# Patient Record
Sex: MALE | Race: WHITE | NOT HISPANIC OR LATINO | Employment: FULL TIME | URBAN - METROPOLITAN AREA
[De-identification: names, ages, dates, MRNs, and addresses within clinical notes are randomized per-mention and may not be internally consistent; named-entity substitution may affect disease eponyms.]

---

## 2018-05-02 ENCOUNTER — OFFICE VISIT (OUTPATIENT)
Dept: CARDIOLOGY | Facility: CLINIC | Age: 63
End: 2018-05-02
Payer: COMMERCIAL

## 2018-05-02 ENCOUNTER — HOSPITAL ENCOUNTER (OUTPATIENT)
Dept: CARDIOLOGY | Facility: CLINIC | Age: 63
Discharge: HOME | End: 2018-05-02
Payer: COMMERCIAL

## 2018-05-02 ENCOUNTER — TELEPHONE (OUTPATIENT)
Dept: CARDIOLOGY | Facility: CLINIC | Age: 63
End: 2018-05-02

## 2018-05-02 VITALS
HEART RATE: 99 BPM | BODY MASS INDEX: 36.58 KG/M2 | DIASTOLIC BLOOD PRESSURE: 81 MMHG | WEIGHT: 270.06 LBS | HEIGHT: 72 IN | SYSTOLIC BLOOD PRESSURE: 132 MMHG

## 2018-05-02 DIAGNOSIS — R00.2 PALPITATION: Primary | ICD-10-CM

## 2018-05-02 DIAGNOSIS — R00.2 PALPITATION: ICD-10-CM

## 2018-05-02 DIAGNOSIS — M16.10 HIP ARTHRITIS: ICD-10-CM

## 2018-05-02 DIAGNOSIS — E66.09 CLASS 2 OBESITY DUE TO EXCESS CALORIES WITHOUT SERIOUS COMORBIDITY WITH BODY MASS INDEX (BMI) OF 36.0 TO 36.9 IN ADULT: ICD-10-CM

## 2018-05-02 PROBLEM — E66.812 CLASS 2 OBESITY DUE TO EXCESS CALORIES WITHOUT SERIOUS COMORBIDITY IN ADULT: Status: ACTIVE | Noted: 2018-05-02

## 2018-05-02 PROCEDURE — 99999 PR PBB SHADOW E&M-EST. PATIENT-LVL III: CPT | Mod: PBBFAC,,, | Performed by: INTERNAL MEDICINE

## 2018-05-02 PROCEDURE — 93000 ELECTROCARDIOGRAM COMPLETE: CPT | Mod: S$GLB,,, | Performed by: INTERNAL MEDICINE

## 2018-05-02 PROCEDURE — 99203 OFFICE O/P NEW LOW 30 MIN: CPT | Mod: S$GLB,,, | Performed by: INTERNAL MEDICINE

## 2018-05-02 RX ORDER — CYCLOBENZAPRINE HCL 5 MG
TABLET ORAL 3 TIMES DAILY PRN
COMMUNITY
Start: 2018-04-20 | End: 2018-08-30 | Stop reason: CLARIF

## 2018-05-02 NOTE — TELEPHONE ENCOUNTER
----- Message from Lizette Ramirez sent at 5/2/2018  3:36 PM CDT -----  Regarding: EKG ORDER  Please put in EKG order, thanks. Lizette [phone]

## 2018-05-02 NOTE — PROGRESS NOTES
Subjective:    Patient ID:  Randolph Villeda is a 62 y.o. male who presents for evaluation of Pre-op Exam (left hip replacement)      HPI     The patient is a 62 year old male presents for pre-op evaluation or a total right hip. He has been in good health and has not history of CAD, hypertension, diabetes and is on no meds.. 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 EKG is normal.      No results found for: NA, K, CL, CO2, BUN, CREATININE, GLU, HGBA1C, MG, AST, ALT, ALBUMIN, PROT, BILITOT, WBC, HGB, HCT, MCV, PLT, INR, PSA, TSH      No results found for: CHOL, HDL, TRIG    No results found for: LDLCALC    No past medical history on file.    Current Outpatient Prescriptions:     cyclobenzaprine (FLEXERIL) 5 MG tablet, 3 (three) times daily as needed. , Disp: , Rfl:         Review of Systems   Constitution: Negative for decreased appetite, diaphoresis, fever, weakness, malaise/fatigue, weight gain and weight loss.   HENT: Negative for congestion, ear discharge, ear pain and nosebleeds.    Eyes: Negative for blurred vision, double vision and visual disturbance.   Cardiovascular: Negative for chest pain, claudication, cyanosis, dyspnea on exertion, irregular heartbeat, leg swelling, near-syncope, orthopnea, palpitations, paroxysmal nocturnal dyspnea and syncope.   Respiratory: Negative for cough, hemoptysis, shortness of breath, sleep disturbances due to breathing, snoring, sputum production and wheezing.    Endocrine: Negative for polydipsia, polyphagia and polyuria.   Hematologic/Lymphatic: Negative for adenopathy and bleeding problem. Does not bruise/bleed easily.   Skin: Negative for color change, nail changes, poor wound healing and rash.   Musculoskeletal: Positive for joint pain (right hip). Negative for muscle cramps and muscle weakness.   Gastrointestinal: Negative for abdominal pain, anorexia, change in bowel habit, hematochezia, nausea and vomiting.   Genitourinary: Negative for dysuria, frequency and  hematuria.   Neurological: Negative for brief paralysis, difficulty with concentration, excessive daytime sleepiness, dizziness, focal weakness, headaches, light-headedness, seizures and vertigo.   Psychiatric/Behavioral: Negative for altered mental status and depression.   Allergic/Immunologic: Negative for persistent infections.        Objective:/81 (BP Location: Left arm, Patient Position: Sitting, BP Method: X-Large (Automatic))   Pulse 99   Ht 6' (1.829 m)   Wt 122.5 kg (270 lb 1 oz)   BMI 36.63 kg/m²           Physical Exam   Constitutional: He is oriented to person, place, and time. He appears well-developed and well-nourished.   obese   HENT:   Head: Normocephalic.   Right Ear: External ear normal.   Left Ear: External ear normal.   Nose: Nose normal.   Inspection of lips, teeth and gums normal   Eyes: EOM are normal. Pupils are equal, round, and reactive to light. No scleral icterus.   Neck: Normal range of motion. Neck supple. No JVD present. No tracheal deviation present. No thyromegaly present.   Cardiovascular: Normal rate, regular rhythm, S1 normal, S2 normal and intact distal pulses.  Exam reveals no gallop and no friction rub.    No murmur heard.  Pulses:       Carotid pulses are 2+ on the right side, and 2+ on the left side.       Dorsalis pedis pulses are 2+ on the right side.        Posterior tibial pulses are 2+ on the right side, and 2+ on the left side.   Pulmonary/Chest: Effort normal and breath sounds normal.   Abdominal: Bowel sounds are normal. He exhibits no distension. There is no hepatosplenomegaly. There is no tenderness. There is no guarding.   Musculoskeletal: Normal range of motion. He exhibits no edema or tenderness.   Lymphadenopathy:   Palpation of neck and groin lymph nodes normal   Neurological: He is alert and oriented to person, place, and time. No cranial nerve deficit. He exhibits normal muscle tone. Coordination normal.   Skin: Skin is dry.   No ankle nor  pretibial edema   Psychiatric: His behavior is normal. Judgment and thought content normal.         Assessment:       1. Hip arthritis    2. Class 2 obesity due to excess calories without serious comorbidity with body mass index (BMI) of 36.0 to 36.9 in adult         Plan:       Randolph was seen today for pre-op exam.    Diagnoses and all orders for this visit:    Hip arthritis    Class 2 obesity due to excess calories without serious comorbidity with body mass index (BMI) of 36.0 to 36.9 in adult    Other orders  -     cyclobenzaprine (FLEXERIL) 5 MG tablet; 3 (three) times daily as needed.     Low CV risk for planned RTKR

## 2018-08-31 PROBLEM — M47.812 OSTEOARTHRITIS OF CERVICAL SPINE: Status: ACTIVE | Noted: 2018-08-31

## 2019-07-05 ENCOUNTER — OFFICE VISIT (OUTPATIENT)
Dept: ENDOCRINOLOGY | Facility: CLINIC | Age: 64
End: 2019-07-05
Payer: COMMERCIAL

## 2019-07-05 ENCOUNTER — LAB VISIT (OUTPATIENT)
Dept: LAB | Facility: HOSPITAL | Age: 64
End: 2019-07-05
Attending: INTERNAL MEDICINE
Payer: COMMERCIAL

## 2019-07-05 VITALS
WEIGHT: 259.81 LBS | BODY MASS INDEX: 35.19 KG/M2 | HEART RATE: 89 BPM | SYSTOLIC BLOOD PRESSURE: 112 MMHG | HEIGHT: 72 IN | DIASTOLIC BLOOD PRESSURE: 70 MMHG

## 2019-07-05 DIAGNOSIS — R35.89 POLYURIA: ICD-10-CM

## 2019-07-05 DIAGNOSIS — E11.9 NEW ONSET TYPE 2 DIABETES MELLITUS: ICD-10-CM

## 2019-07-05 DIAGNOSIS — R63.4 WEIGHT LOSS: ICD-10-CM

## 2019-07-05 DIAGNOSIS — E11.9 NEW ONSET TYPE 2 DIABETES MELLITUS: Primary | ICD-10-CM

## 2019-07-05 LAB
ALBUMIN SERPL BCP-MCNC: 4.3 G/DL (ref 3.5–5.2)
ALP SERPL-CCNC: 95 U/L (ref 55–135)
ALT SERPL W/O P-5'-P-CCNC: 71 U/L (ref 10–44)
ANION GAP SERPL CALC-SCNC: 10 MMOL/L (ref 8–16)
AST SERPL-CCNC: 56 U/L (ref 10–40)
BILIRUB SERPL-MCNC: 0.6 MG/DL (ref 0.1–1)
BUN SERPL-MCNC: 19 MG/DL (ref 8–23)
CALCIUM SERPL-MCNC: 9.4 MG/DL (ref 8.7–10.5)
CHLORIDE SERPL-SCNC: 100 MMOL/L (ref 95–110)
CO2 SERPL-SCNC: 26 MMOL/L (ref 23–29)
CREAT SERPL-MCNC: 1.2 MG/DL (ref 0.5–1.4)
EST. GFR  (AFRICAN AMERICAN): >60 ML/MIN/1.73 M^2
EST. GFR  (NON AFRICAN AMERICAN): >60 ML/MIN/1.73 M^2
ESTIMATED AVG GLUCOSE: 255 MG/DL (ref 68–131)
GLUCOSE SERPL-MCNC: 280 MG/DL (ref 70–110)
HBA1C MFR BLD HPLC: 10.5 % (ref 4–5.6)
POTASSIUM SERPL-SCNC: 4.5 MMOL/L (ref 3.5–5.1)
PROT SERPL-MCNC: 7.8 G/DL (ref 6–8.4)
SODIUM SERPL-SCNC: 136 MMOL/L (ref 136–145)

## 2019-07-05 PROCEDURE — 80053 COMPREHEN METABOLIC PANEL: CPT | Mod: PO

## 2019-07-05 PROCEDURE — 99205 OFFICE O/P NEW HI 60 MIN: CPT | Mod: S$GLB,,, | Performed by: INTERNAL MEDICINE

## 2019-07-05 PROCEDURE — 99999 PR PBB SHADOW E&M-EST. PATIENT-LVL III: ICD-10-PCS | Mod: PBBFAC,,, | Performed by: INTERNAL MEDICINE

## 2019-07-05 PROCEDURE — 3046F HEMOGLOBIN A1C LEVEL >9.0%: CPT | Mod: CPTII,S$GLB,, | Performed by: INTERNAL MEDICINE

## 2019-07-05 PROCEDURE — 3008F PR BODY MASS INDEX (BMI) DOCUMENTED: ICD-10-PCS | Mod: CPTII,S$GLB,, | Performed by: INTERNAL MEDICINE

## 2019-07-05 PROCEDURE — 3046F PR MOST RECENT HEMOGLOBIN A1C LEVEL > 9.0%: ICD-10-PCS | Mod: CPTII,S$GLB,, | Performed by: INTERNAL MEDICINE

## 2019-07-05 PROCEDURE — 3008F BODY MASS INDEX DOCD: CPT | Mod: CPTII,S$GLB,, | Performed by: INTERNAL MEDICINE

## 2019-07-05 PROCEDURE — 99205 PR OFFICE/OUTPT VISIT, NEW, LEVL V, 60-74 MIN: ICD-10-PCS | Mod: S$GLB,,, | Performed by: INTERNAL MEDICINE

## 2019-07-05 PROCEDURE — 99999 PR PBB SHADOW E&M-EST. PATIENT-LVL III: CPT | Mod: PBBFAC,,, | Performed by: INTERNAL MEDICINE

## 2019-07-05 PROCEDURE — 36415 COLL VENOUS BLD VENIPUNCTURE: CPT | Mod: PO

## 2019-07-05 PROCEDURE — 83036 HEMOGLOBIN GLYCOSYLATED A1C: CPT

## 2019-07-05 RX ORDER — INSULIN PUMP SYRINGE, 3 ML
EACH MISCELLANEOUS
Qty: 1 EACH | Refills: 0 | Status: SHIPPED | OUTPATIENT
Start: 2019-07-05 | End: 2020-07-04

## 2019-07-05 RX ORDER — LANCETS
EACH MISCELLANEOUS
Qty: 400 EACH | Refills: 0 | Status: SHIPPED | OUTPATIENT
Start: 2019-07-05 | End: 2021-05-07

## 2019-07-05 RX ORDER — LANCETS
EACH MISCELLANEOUS
Qty: 150 EACH | Refills: 11 | Status: CANCELLED | OUTPATIENT
Start: 2019-07-05

## 2019-07-05 RX ORDER — INSULIN GLARGINE 100 [IU]/ML
10 INJECTION, SOLUTION SUBCUTANEOUS DAILY
Qty: 1 SYRINGE | Refills: 0 | COMMUNITY
Start: 2019-07-05 | End: 2019-07-17

## 2019-07-05 RX ORDER — INSULIN GLARGINE 100 [IU]/ML
15 INJECTION, SOLUTION SUBCUTANEOUS DAILY
Qty: 15 ML | Refills: 3 | Status: SHIPPED | OUTPATIENT
Start: 2019-07-05 | End: 2019-07-17

## 2019-07-05 RX ORDER — METFORMIN HYDROCHLORIDE 500 MG/1
500 TABLET ORAL 2 TIMES DAILY WITH MEALS
Qty: 60 TABLET | Refills: 3 | Status: SHIPPED | OUTPATIENT
Start: 2019-07-05 | End: 2019-07-17

## 2019-07-05 RX ORDER — GLIMEPIRIDE 2 MG/1
2 TABLET ORAL
Qty: 30 TABLET | Refills: 3 | Status: SHIPPED | OUTPATIENT
Start: 2019-07-05 | End: 2019-07-31

## 2019-07-05 RX ORDER — INSULIN PUMP SYRINGE, 3 ML
EACH MISCELLANEOUS
Qty: 1 EACH | Refills: 0 | Status: CANCELLED | OUTPATIENT
Start: 2019-07-05 | End: 2020-07-04

## 2019-07-05 NOTE — TELEPHONE ENCOUNTER
----- Message from Ty Frey sent at 7/5/2019 12:14 PM CDT -----  Contact: self  Patient want to speak with a nurse regarding clarification on insulin please call back at [phone] (home)

## 2019-07-05 NOTE — TELEPHONE ENCOUNTER
Please clarify  lantus sample states 10 units daily  Lantus Rx and chart note states 15 units daily    Please advise    Also will send Rx for meter and test strips in separate Rx encounter

## 2019-07-05 NOTE — TELEPHONE ENCOUNTER
Spoke w/ pt, verified should be giving 15 units daily of Lantus, not 10 units.  Pt verbalized understanding.  Will also send insurance preferred meter, strips and lancets to the pharmacy.

## 2019-07-05 NOTE — PROGRESS NOTES
CHIEF COMPLAINT: DM 2  63 year old being seen as a new patient. Here with wife. Recently seen by urology and when had labs done found to have . Went to [facility] ED 7/2/19. Given IVF and 10 subcutaneous regular insulin. BG decreased to 475 and discharged. Not currently on any DM Medications. They did buy a glucometer and checked this AM and BG was 413 before eating. Seeing urology for polyuria. States it started last week. No recent steroids. Was not sick at the time. + Polydipsia. States that had been eating increased amounts of deserts due to their daughter being in town. No N/V. No paresthesias.       PAST MEDICAL HISTORY/PAST SURGICAL HISTORY:  Reviewed in [facility]    SOCIAL HISTORY: Quit smoking 1970s. No alcohol    FAMILY HISTORY:  + DM 2. No known thyroid disease.     MEDICATIONS/ALLERGIES: The patient's MedCard has been updated and reviewed.      ROS:   Constitutional: weight decreased  Eyes: No Blurry vision. Had a recent retinal eye exam in June.   ENT: No dysphagia  Cardiovascular: Denies current anginal symptoms  Respiratory: Denies current respiratory difficulty  Gastrointestinal: Denies recent bowel disturbances  GenitoUrinary - No dysuria  Skin: No new skin rash  Neurologic: No focal neurologic complaints  Remainder ROS negative        PE:    GENERAL: Well developed, well nourished.  PSYCH:  appropriate mood and affect  EYES:  PERRL, EOM intact.  ENT: Nares patent, oropharynx clear, mucosa pink,   NECK: Supple, trachea midline, no palpable thyroid nodules  CHEST: Resp even and unlabored, CTA bilateral.  CARDIAC: RRR, S1, S2 heard, no murmurs, rubs, S3, or S4  ABDOMEN: Soft, non-tender, non-distended;  No organomegaly  VASCULAR:  DP pulses +2/4 bilaterally, no edema  NEURO: Gait steady, CN II-VII grossly intact  SKIN: No areas of breakdown, no acanthosis nigracans.  FEET- normal monofilament exam. 2 + pedal pulses. No cuts or abrasions.     LABS     ASSESSMENT/PLAN:  1. DM 2- Ne Onset. He is  symptomatic. Discussed would be best to start insulin. Showed how to use insulin pen and allowed him to give himself Lantus 15 units today in clinic. Discussed hypoglycemia and rule of 15. Start amaryl 2 mg daily. WIll start metformin but discussed lionel take several weeks to start working. Discussed s/e. Check BG AC/HS. Once BG start improving can transition off RAIN +/- insulin. Discussed yearly eye exam and foot care. Discussed that will most likely need a statin but can discuss at f/u.     2. Polyuria- due to # 1    3. Weight loss- most likely due to # 1.       FOLLOWUP  Copy of eye exam records- Dr. Miguel  Start Lantus 15 units today  DM Education- ASAP  Today CMP, A1c- do CMP stat  F/U 2 weeks- C-peptide, CMP, FLP- double Book 7/17.   [place]

## 2019-07-10 ENCOUNTER — CLINICAL SUPPORT (OUTPATIENT)
Dept: DIABETES | Facility: CLINIC | Age: 64
End: 2019-07-10
Payer: COMMERCIAL

## 2019-07-10 VITALS — WEIGHT: 259.94 LBS | BODY MASS INDEX: 35.21 KG/M2 | HEIGHT: 72 IN

## 2019-07-10 DIAGNOSIS — E11.9 NEW ONSET TYPE 2 DIABETES MELLITUS: ICD-10-CM

## 2019-07-10 PROCEDURE — G0108 PR DIAB MANAGE TRN  PER INDIV: ICD-10-PCS | Mod: S$GLB,,, | Performed by: DIETITIAN, REGISTERED

## 2019-07-10 PROCEDURE — G0108 DIAB MANAGE TRN  PER INDIV: HCPCS | Mod: S$GLB,,, | Performed by: DIETITIAN, REGISTERED

## 2019-07-10 PROCEDURE — 99999 PR PBB SHADOW E&M-EST. PATIENT-LVL I: ICD-10-PCS | Mod: PBBFAC,,,

## 2019-07-10 PROCEDURE — 99999 PR PBB SHADOW E&M-EST. PATIENT-LVL I: CPT | Mod: PBBFAC,,,

## 2019-07-10 NOTE — PROGRESS NOTES
Diabetes Education  Author: Dayna Li RD, CDE  Date: 7/10/2019    Diabetes Care Management Summary  Diabetes Education Record Assessment/Progress: Initial-Patient diagnosed with dm last week when at the urologist office.  He admits that symptoms of hyperglycemia were probably going on for a few months.  He was seen in Endocrinology last week and started on medications and insulin.  Appears to be doing much better already  Current Diabetes Risk Level: High     Last A1c:   Lab Results   Component Value Date    HGBA1C 10.5 (H) 07/05/2019     Last Visit with Diabetes Educator: : 07/10/2019  Last OPCM Referral: : Not Found   Enrolled in OPCM: No      Diabetes Type  Diabetes Type : Type II    Diabetes History  Diabetes Diagnosis: 0-1 year-new diagnosis  Current Treatment: Oral Medication, Insulin-Lantus 15 units daily, Metformin, Amaryl  Reviewed Problem List with Patient: Yes    Health Maintenance was reviewed today with patient. Discussed with patient importance of routine eye exams, foot exams/foot care, blood work (i.e.: A1c, microalbumin, and lipid), dental visits, yearly flu vaccine, and pneumonia vaccine as indicated by PCP. Patient verbalized understanding.     Health Maintenance Topics with due status: Not Due       Topic Last Completion Date    TETANUS VACCINE 04/08/2016    Foot Exam 07/05/2019    Hemoglobin A1c 07/05/2019    Influenza Vaccine Not Due     Health Maintenance Due   Topic Date Due    Hepatitis C Screening  1955    Lipid Panel  1955    Eye Exam  09/03/1966    Urine Microalbumin  09/03/1966    Pneumococcal Vaccine (Medium Risk) (1 of 1 - PPSV23) 09/03/1975    Low Dose Statin  09/03/1977    Colonoscopy  09/03/2006       Nutrition  Meal Planning: 3 meals per day, snacks between meal, diet drinks-Patient had previously been skipping breakfast.  Now eating 3 meals daily and has cut out concentrated sweets.  Had been eating more sweets than usual due to family being in town.  All  diet drinks  What type of beverages do you drink: diet soda/tea, water  Meal Plan 24 Hour Recall - Breakfast: Cereal 1/2 cup milk  Meal Plan 24 Hour Recall - Lunch: [place]  Meal Plan 24 Hour Recall - Dinner: Last night had Mexican meal- ate fajitas with no tortillas  Meal Plan 24 Hour Recall - Snack: 1/2 cup watermelon and a few grapes    Monitoring   Monitoring: Went out and bought a meter  Self Monitoring : Yes, monitoring 4 times daily  Blood Glucose Logs: Yes-Does not have logs with him today but states that everyday they are steadily decreasing.  Usually in 100-140 range in past 2 days  Do you use a personal continuous glucose monitor: No  In the last month, how often have you had a low blood sugar reaction: never  Can you tell when your blood sugar is too high: no    Exercise   Exercise Type: (Active but no programmed activity)    Current Diabetes Treatment   Current Treatment: Oral Medication, Insulin-Lantus 15 units daily, Metformin, Amaryl    Social History  Preferred Learning Method: Face to Face  Primary Support: Self, Spouse-wife present at visit today    Barriers to Change  Barriers to Change: None  Learning Challenges : None    Readiness to Learn   Readiness to Learn : Acceptance    Cultural Influences  Cultural Influences: No    Diabetes Education Assessment/Progress  Diabetes Disease Process (diabetes disease process and treatment options): Discussion, Comprehends Key Points, Written Materials Provided-Reviewed goal blood sugars and A1c value    Nutrition (Incorporating nutritional management into one's lifestyle): Discussion, Comprehends Key Points, Written Materials Provided-Reviewed carb sources and appropriate amounts per meal and snack.  Discussed label reading and tips for eating out.  Reviewed possible apps he could use to help with out to eat meals.  Discussed eating some carb with each meal to prevent hypoglycemia    Physical Activity (incorporating physical activity into one's lifestyle):  Discussion    Medications (states correct name, dose, onset, peak, duration, side effects & timing of meds): Discussion, Comprehends Key Points, Written Materials Provided-Taking Lantus at the same time daily.  Discussed action of other 2 oral medications.  Appears to be doing well with compliance with medications     Monitoring (monitoring blood glucose/other parameters & using results): Discussion-For now he will continue monitoring 3-4 times daily.  May be able to decrease frequency once sugars are more stable.  He is keeping logs as instructed and will bring them to his visit with Dr. Mcfarland next week for review    Acute Complications (preventing, detecting, and treating acute complications): Discussion, Comprehends Key Points, Written Materials Provided-No current hypoglycemia noted.  Discussed prevention treatment and symptoms of hypoglycemia     Behavioral (readiness for change, lifestyle practices, self-care behaviors): Discussion-Patient and wife both very interested in learning more about diabetes and seems motivated to make necessary changes needed to control blood sugars.     Goals  Patient has selected/evaluated goals during today's session: Yes, selected  Healthy Eating: In Progress-Will limit snacks to less than 15-20 grams of carb per snack    Diabetes Meal Plan  Calories: 1800  Carbohydrate Per Meal: 45-60g  Carbohydrate Per Snack : 15-20g    Today's Self-Management Care Plan was developed with the patient's input and is based on barriers identified during today's assessment.    The long and short-term goals in the care plan were written with the patient/caregiver's input. The patient has agreed to work toward these goals to improve his overall diabetes control.      The patient received a copy of today's self-management plan and verbalized understanding of the care plan, goals, and all of today's instructions.      The patient was encouraged to communicate with his physician and care team regarding  his condition(s) and treatment.  I provided the patient with my contact information today and encouraged him to contact me via phone or patient portal as needed.     Education Units of Time   Time Spent: 75 min

## 2019-07-17 ENCOUNTER — OFFICE VISIT (OUTPATIENT)
Dept: ENDOCRINOLOGY | Facility: CLINIC | Age: 64
End: 2019-07-17
Payer: COMMERCIAL

## 2019-07-17 VITALS
RESPIRATION RATE: 18 BRPM | BODY MASS INDEX: 35 KG/M2 | DIASTOLIC BLOOD PRESSURE: 76 MMHG | WEIGHT: 258.38 LBS | HEIGHT: 72 IN | HEART RATE: 76 BPM | SYSTOLIC BLOOD PRESSURE: 118 MMHG

## 2019-07-17 DIAGNOSIS — E11.9 TYPE 2 DIABETES MELLITUS WITHOUT COMPLICATION, WITHOUT LONG-TERM CURRENT USE OF INSULIN: Primary | ICD-10-CM

## 2019-07-17 PROCEDURE — 99213 PR OFFICE/OUTPT VISIT, EST, LEVL III, 20-29 MIN: ICD-10-PCS | Mod: S$GLB,,, | Performed by: INTERNAL MEDICINE

## 2019-07-17 PROCEDURE — 99999 PR PBB SHADOW E&M-EST. PATIENT-LVL III: ICD-10-PCS | Mod: PBBFAC,,, | Performed by: INTERNAL MEDICINE

## 2019-07-17 PROCEDURE — 3046F PR MOST RECENT HEMOGLOBIN A1C LEVEL > 9.0%: ICD-10-PCS | Mod: CPTII,S$GLB,, | Performed by: INTERNAL MEDICINE

## 2019-07-17 PROCEDURE — 3008F PR BODY MASS INDEX (BMI) DOCUMENTED: ICD-10-PCS | Mod: CPTII,S$GLB,, | Performed by: INTERNAL MEDICINE

## 2019-07-17 PROCEDURE — 3008F BODY MASS INDEX DOCD: CPT | Mod: CPTII,S$GLB,, | Performed by: INTERNAL MEDICINE

## 2019-07-17 PROCEDURE — 99999 PR PBB SHADOW E&M-EST. PATIENT-LVL III: CPT | Mod: PBBFAC,,, | Performed by: INTERNAL MEDICINE

## 2019-07-17 PROCEDURE — 99213 OFFICE O/P EST LOW 20 MIN: CPT | Mod: S$GLB,,, | Performed by: INTERNAL MEDICINE

## 2019-07-17 PROCEDURE — 3046F HEMOGLOBIN A1C LEVEL >9.0%: CPT | Mod: CPTII,S$GLB,, | Performed by: INTERNAL MEDICINE

## 2019-07-17 RX ORDER — METFORMIN HYDROCHLORIDE 1000 MG/1
1000 TABLET ORAL 2 TIMES DAILY WITH MEALS
Qty: 60 TABLET | Refills: 3 | Status: SHIPPED | OUTPATIENT
Start: 2019-07-17 | End: 2019-11-27 | Stop reason: SDUPTHER

## 2019-07-17 RX ORDER — PEN NEEDLE, DIABETIC 31 GX5/16"
NEEDLE, DISPOSABLE MISCELLANEOUS
Refills: 3 | COMMUNITY
Start: 2019-07-05 | End: 2021-05-07

## 2019-07-17 NOTE — PROGRESS NOTES
CHIEF COMPLAINT: DM 2  63 year old being seen as a f/u. Here for BG log review. On Lantus 15, metformin and amaryl 2 mg daily. Lowest BG in 70's. States no symptoms. He did have diarrhea initially with metformin but has since improved.                   PAST MEDICAL HISTORY/PAST SURGICAL HISTORY:  Reviewed in [facility]    SOCIAL HISTORY: Quit smoking 1970s. No alcohol    FAMILY HISTORY:  + DM 2. No known thyroid disease.     MEDICATIONS/ALLERGIES: The patient's MedCard has been updated and reviewed.      ROS:   Constitutional: weight stable.   Eyes: No Blurry vision. Had a recent retinal eye exam in June.   ENT: No dysphagia  Cardiovascular: Denies current anginal symptoms  Respiratory: Denies current respiratory difficulty  Gastrointestinal: Denies recent bowel disturbances  GenitoUrinary - No dysuria  Skin: No new skin rash  Neurologic: No focal neurologic complaints  Remainder ROS negative        PE:    GENERAL: Well developed, well nourished.  NECK: Supple, trachea midline, no palpable thyroid nodules  CHEST: Resp even and unlabored, CTA bilateral.  CARDIAC: RRR, S1, S2 heard, no murmurs, rubs, S3, or S4              ASSESSMENT/PLAN:  1. DM 2- BG significantly improved. Will increase metformin to 1000 mg BID. Call if any GI s/e. Will stop lantus. Continue glimeperide for now. Advised sending a log in 2 weeks. At that time will get a better idea if needs a 2nd agent and if so will switch RAIN to another medication that does not increase hypoglycemia risk and add to weight gain. Has seen DM education. Discussed at f/u with check FLP and most likely start statin.       FOLLOWUP  F/U 3 months with me- CMP, A1c, FLP- double book

## 2019-07-31 ENCOUNTER — TELEPHONE (OUTPATIENT)
Dept: ENDOCRINOLOGY | Facility: CLINIC | Age: 64
End: 2019-07-31

## 2019-07-31 NOTE — TELEPHONE ENCOUNTER
Let him know I reviewed the logs. Looks like getting hypoglycemia. Can you verify he is off insulin.    Will stop amaryl    Stay on metformin

## 2019-08-01 ENCOUNTER — PATIENT MESSAGE (OUTPATIENT)
Dept: ENDOCRINOLOGY | Facility: CLINIC | Age: 64
End: 2019-08-01

## 2019-08-02 ENCOUNTER — PATIENT MESSAGE (OUTPATIENT)
Dept: ENDOCRINOLOGY | Facility: CLINIC | Age: 64
End: 2019-08-02

## 2019-08-02 NOTE — TELEPHONE ENCOUNTER
Pt not on insulin  Confused about hypglycemia (some education provided)  Advised to send in logs in 1 week or immediately if hypoglycemic

## 2019-08-02 NOTE — TELEPHONE ENCOUNTER
Should not be getting hypoglycemia now that on metformin alone. Hypoglycemia is a side effect of amaryl and insulin, but he is off those now    Although we want his blood sugar controlled we do not want hypoglycemia. But should not happen anymore.     Can send log in 4 weeks

## 2019-08-14 ENCOUNTER — PATIENT MESSAGE (OUTPATIENT)
Dept: ENDOCRINOLOGY | Facility: CLINIC | Age: 64
End: 2019-08-14

## 2019-08-14 NOTE — TELEPHONE ENCOUNTER
Can stay on metformin. At this point can just check once a day. Can stagger and check AM and periodically before meals

## 2019-08-29 ENCOUNTER — PATIENT MESSAGE (OUTPATIENT)
Dept: ENDOCRINOLOGY | Facility: CLINIC | Age: 64
End: 2019-08-29

## 2019-08-30 ENCOUNTER — PATIENT MESSAGE (OUTPATIENT)
Dept: ENDOCRINOLOGY | Facility: CLINIC | Age: 64
End: 2019-08-30

## 2019-10-05 ENCOUNTER — LAB VISIT (OUTPATIENT)
Dept: LAB | Facility: HOSPITAL | Age: 64
End: 2019-10-05
Attending: INTERNAL MEDICINE
Payer: COMMERCIAL

## 2019-10-05 DIAGNOSIS — E11.9 TYPE 2 DIABETES MELLITUS WITHOUT COMPLICATION, WITHOUT LONG-TERM CURRENT USE OF INSULIN: ICD-10-CM

## 2019-10-05 LAB
ALBUMIN SERPL BCP-MCNC: 4.1 G/DL (ref 3.5–5.2)
ALP SERPL-CCNC: 81 U/L (ref 55–135)
ALT SERPL W/O P-5'-P-CCNC: 23 U/L (ref 10–44)
ANION GAP SERPL CALC-SCNC: 8 MMOL/L (ref 8–16)
AST SERPL-CCNC: 20 U/L (ref 10–40)
BILIRUB SERPL-MCNC: 0.4 MG/DL (ref 0.1–1)
BUN SERPL-MCNC: 24 MG/DL (ref 8–23)
CALCIUM SERPL-MCNC: 9.6 MG/DL (ref 8.7–10.5)
CHLORIDE SERPL-SCNC: 106 MMOL/L (ref 95–110)
CHOLEST SERPL-MCNC: 183 MG/DL (ref 120–199)
CHOLEST/HDLC SERPL: 5.2 {RATIO} (ref 2–5)
CO2 SERPL-SCNC: 26 MMOL/L (ref 23–29)
CREAT SERPL-MCNC: 1 MG/DL (ref 0.5–1.4)
EST. GFR  (AFRICAN AMERICAN): >60 ML/MIN/1.73 M^2
EST. GFR  (NON AFRICAN AMERICAN): >60 ML/MIN/1.73 M^2
ESTIMATED AVG GLUCOSE: 126 MG/DL (ref 68–131)
GLUCOSE SERPL-MCNC: 114 MG/DL (ref 70–110)
HBA1C MFR BLD HPLC: 6 % (ref 4–5.6)
HDLC SERPL-MCNC: 35 MG/DL (ref 40–75)
HDLC SERPL: 19.1 % (ref 20–50)
LDLC SERPL CALC-MCNC: 115.8 MG/DL (ref 63–159)
NONHDLC SERPL-MCNC: 148 MG/DL
POTASSIUM SERPL-SCNC: 4.3 MMOL/L (ref 3.5–5.1)
PROT SERPL-MCNC: 7.3 G/DL (ref 6–8.4)
SODIUM SERPL-SCNC: 140 MMOL/L (ref 136–145)
TRIGL SERPL-MCNC: 161 MG/DL (ref 30–150)

## 2019-10-05 PROCEDURE — 80053 COMPREHEN METABOLIC PANEL: CPT

## 2019-10-05 PROCEDURE — 83036 HEMOGLOBIN GLYCOSYLATED A1C: CPT

## 2019-10-05 PROCEDURE — 80061 LIPID PANEL: CPT

## 2019-10-05 PROCEDURE — 36415 COLL VENOUS BLD VENIPUNCTURE: CPT | Mod: PO

## 2019-10-14 ENCOUNTER — OFFICE VISIT (OUTPATIENT)
Dept: ENDOCRINOLOGY | Facility: CLINIC | Age: 64
End: 2019-10-14
Payer: COMMERCIAL

## 2019-10-14 VITALS
SYSTOLIC BLOOD PRESSURE: 140 MMHG | BODY MASS INDEX: 32.23 KG/M2 | HEART RATE: 65 BPM | HEIGHT: 72 IN | DIASTOLIC BLOOD PRESSURE: 80 MMHG | WEIGHT: 238 LBS

## 2019-10-14 DIAGNOSIS — E11.9 TYPE 2 DIABETES MELLITUS WITHOUT COMPLICATION, WITHOUT LONG-TERM CURRENT USE OF INSULIN: Primary | ICD-10-CM

## 2019-10-14 PROCEDURE — 99213 PR OFFICE/OUTPT VISIT, EST, LEVL III, 20-29 MIN: ICD-10-PCS | Mod: S$GLB,,, | Performed by: INTERNAL MEDICINE

## 2019-10-14 PROCEDURE — 3044F HG A1C LEVEL LT 7.0%: CPT | Mod: CPTII,S$GLB,, | Performed by: INTERNAL MEDICINE

## 2019-10-14 PROCEDURE — 3008F BODY MASS INDEX DOCD: CPT | Mod: CPTII,S$GLB,, | Performed by: INTERNAL MEDICINE

## 2019-10-14 PROCEDURE — 3044F PR MOST RECENT HEMOGLOBIN A1C LEVEL <7.0%: ICD-10-PCS | Mod: CPTII,S$GLB,, | Performed by: INTERNAL MEDICINE

## 2019-10-14 PROCEDURE — 99999 PR PBB SHADOW E&M-EST. PATIENT-LVL III: CPT | Mod: PBBFAC,,, | Performed by: INTERNAL MEDICINE

## 2019-10-14 PROCEDURE — 99999 PR PBB SHADOW E&M-EST. PATIENT-LVL III: ICD-10-PCS | Mod: PBBFAC,,, | Performed by: INTERNAL MEDICINE

## 2019-10-14 PROCEDURE — 99213 OFFICE O/P EST LOW 20 MIN: CPT | Mod: S$GLB,,, | Performed by: INTERNAL MEDICINE

## 2019-10-14 PROCEDURE — 3008F PR BODY MASS INDEX (BMI) DOCUMENTED: ICD-10-PCS | Mod: CPTII,S$GLB,, | Performed by: INTERNAL MEDICINE

## 2019-10-14 NOTE — PROGRESS NOTES
CHIEF COMPLAINT: DM 2  64 year old being seen as a f/u. On metformin only at this time. No Polyuria. No paresthesias. Has been watching diet. Has been staying very active.         PAST MEDICAL HISTORY/PAST SURGICAL HISTORY:  Reviewed in [facility]    SOCIAL HISTORY: Quit smoking 1970s. No alcohol    FAMILY HISTORY:  + DM 2. No known thyroid disease.     MEDICATIONS/ALLERGIES: The patient's MedCard has been updated and reviewed.      ROS:   Constitutional: weight decreased  Eyes: No Blurry vision.   ENT: No dysphagia  Cardiovascular: Denies current anginal symptoms  Respiratory: Denies current respiratory difficulty  Gastrointestinal: Denies recent bowel disturbances  GenitoUrinary - No dysuria  Skin: No new skin rash  Neurologic: No focal neurologic complaints  Remainder ROS negative        PE:    GENERAL: Well developed, well nourished.  NECK: Supple, trachea midline, no palpable thyroid nodules  CHEST: Resp even and unlabored, CTA bilateral.  CARDIAC: RRR, S1, S2 heard, no murmurs, rubs, S3, or S4      Results for JOSAFAT FELTONOLAMIDE OMALLEY (MRN [id number]) as of 10/14/2019 14:04   Ref. Range 10/5/2019 07:44   Sodium Latest Ref Range: 136 - 145 mmol/L 140   Potassium Latest Ref Range: 3.5 - 5.1 mmol/L 4.3   Chloride Latest Ref Range: 95 - 110 mmol/L 106   CO2 Latest Ref Range: 23 - 29 mmol/L 26   Anion Gap Latest Ref Range: 8 - 16 mmol/L 8   BUN, Bld Latest Ref Range: 8 - 23 mg/dL 24 (H)   Creatinine Latest Ref Range: 0.5 - 1.4 mg/dL 1.0   eGFR if non African American Latest Ref Range: >60 mL/min/1.73 m^2 >60.0   eGFR if African American Latest Ref Range: >60 mL/min/1.73 m^2 >60.0   Glucose Latest Ref Range: 70 - 110 mg/dL 114 (H)   Calcium Latest Ref Range: 8.7 - 10.5 mg/dL 9.6   Alkaline Phosphatase Latest Ref Range: 55 - 135 U/L 81   PROTEIN TOTAL Latest Ref Range: 6.0 - 8.4 g/dL 7.3   Albumin Latest Ref Range: 3.5 - 5.2 g/dL 4.1   BILIRUBIN TOTAL Latest Ref Range: 0.1 - 1.0 mg/dL 0.4   AST Latest Ref Range: 10 - 40 U/L 20   ALT  Latest Ref Range: 10 - 44 U/L 23   Triglycerides Latest Ref Range: 30 - 150 mg/dL 161 (H)   Cholesterol Latest Ref Range: 120 - 199 mg/dL 183   HDL Latest Ref Range: 40 - 75 mg/dL 35 (L)   Hdl/Cholesterol Ratio Latest Ref Range: 20.0 - 50.0 % 19.1 (L)   LDL Cholesterol External Latest Ref Range: 63.0 - 159.0 mg/dL 115.8   Non-HDL Cholesterol Latest Units: mg/dL 148   Total Cholesterol/HDL Ratio Latest Ref Range: 2.0 - 5.0  5.2 (H)   Hemoglobin A1C External Latest Ref Range: 4.0 - 5.6 % 6.0 (H)   Estimated Avg Glucose Latest Ref Range: 68 - 131 mg/dL 126         ASSESSMENT/PLAN:  1. DM 2- BG significantly improved. On metformin alone at this time. Will continue current Tx. States had eye exam in June. Discussed starting a statin. Discussed benefits and risks of statin with DM. Discussed vascular disease risk with DM. He will think about it and get back with us.       FOLLOWUP  F/U 6 months with me- CMP, A1c, FLP, urine micro

## 2019-11-27 RX ORDER — METFORMIN HYDROCHLORIDE 1000 MG/1
TABLET ORAL
Qty: 60 TABLET | Refills: 3 | Status: SHIPPED | OUTPATIENT
Start: 2019-11-27 | End: 2020-04-30

## 2020-04-25 ENCOUNTER — LAB VISIT (OUTPATIENT)
Dept: LAB | Facility: HOSPITAL | Age: 65
End: 2020-04-25
Attending: INTERNAL MEDICINE
Payer: COMMERCIAL

## 2020-04-25 DIAGNOSIS — E11.9 TYPE 2 DIABETES MELLITUS WITHOUT COMPLICATION, WITHOUT LONG-TERM CURRENT USE OF INSULIN: ICD-10-CM

## 2020-04-25 LAB
ALBUMIN SERPL BCP-MCNC: 4.1 G/DL (ref 3.5–5.2)
ALBUMIN/CREAT UR: 3.3 UG/MG (ref 0–30)
ALP SERPL-CCNC: 76 U/L (ref 55–135)
ALT SERPL W/O P-5'-P-CCNC: 17 U/L (ref 10–44)
ANION GAP SERPL CALC-SCNC: 8 MMOL/L (ref 8–16)
AST SERPL-CCNC: 17 U/L (ref 10–40)
BILIRUB SERPL-MCNC: 0.2 MG/DL (ref 0.1–1)
BUN SERPL-MCNC: 37 MG/DL (ref 8–23)
CALCIUM SERPL-MCNC: 8.9 MG/DL (ref 8.7–10.5)
CHLORIDE SERPL-SCNC: 108 MMOL/L (ref 95–110)
CHOLEST SERPL-MCNC: 206 MG/DL (ref 120–199)
CHOLEST/HDLC SERPL: 4.3 {RATIO} (ref 2–5)
CO2 SERPL-SCNC: 27 MMOL/L (ref 23–29)
CREAT SERPL-MCNC: 1 MG/DL (ref 0.5–1.4)
CREAT UR-MCNC: 120 MG/DL (ref 23–375)
EST. GFR  (AFRICAN AMERICAN): >60 ML/MIN/1.73 M^2
EST. GFR  (NON AFRICAN AMERICAN): >60 ML/MIN/1.73 M^2
ESTIMATED AVG GLUCOSE: 117 MG/DL (ref 68–131)
GLUCOSE SERPL-MCNC: 116 MG/DL (ref 70–110)
HBA1C MFR BLD HPLC: 5.7 % (ref 4–5.6)
HDLC SERPL-MCNC: 48 MG/DL (ref 40–75)
HDLC SERPL: 23.3 % (ref 20–50)
LDLC SERPL CALC-MCNC: 135.2 MG/DL (ref 63–159)
MICROALBUMIN UR DL<=1MG/L-MCNC: 4 UG/ML
NONHDLC SERPL-MCNC: 158 MG/DL
POTASSIUM SERPL-SCNC: 4.3 MMOL/L (ref 3.5–5.1)
PROT SERPL-MCNC: 7.4 G/DL (ref 6–8.4)
SODIUM SERPL-SCNC: 143 MMOL/L (ref 136–145)
TRIGL SERPL-MCNC: 114 MG/DL (ref 30–150)

## 2020-04-25 PROCEDURE — 83036 HEMOGLOBIN GLYCOSYLATED A1C: CPT

## 2020-04-25 PROCEDURE — 82043 UR ALBUMIN QUANTITATIVE: CPT

## 2020-04-25 PROCEDURE — 36415 COLL VENOUS BLD VENIPUNCTURE: CPT | Mod: PO

## 2020-04-25 PROCEDURE — 80053 COMPREHEN METABOLIC PANEL: CPT

## 2020-04-25 PROCEDURE — 80061 LIPID PANEL: CPT

## 2020-04-30 ENCOUNTER — OFFICE VISIT (OUTPATIENT)
Dept: ENDOCRINOLOGY | Facility: CLINIC | Age: 65
End: 2020-04-30
Payer: COMMERCIAL

## 2020-04-30 ENCOUNTER — PATIENT MESSAGE (OUTPATIENT)
Dept: ENDOCRINOLOGY | Facility: CLINIC | Age: 65
End: 2020-04-30

## 2020-04-30 DIAGNOSIS — E11.9 TYPE 2 DIABETES MELLITUS WITHOUT COMPLICATION, WITHOUT LONG-TERM CURRENT USE OF INSULIN: Primary | ICD-10-CM

## 2020-04-30 PROCEDURE — 99213 PR OFFICE/OUTPT VISIT, EST, LEVL III, 20-29 MIN: ICD-10-PCS | Mod: 95,,, | Performed by: INTERNAL MEDICINE

## 2020-04-30 PROCEDURE — 99213 OFFICE O/P EST LOW 20 MIN: CPT | Mod: 95,,, | Performed by: INTERNAL MEDICINE

## 2020-04-30 RX ORDER — ATORVASTATIN CALCIUM 10 MG/1
10 TABLET, FILM COATED ORAL NIGHTLY
Qty: 30 TABLET | Refills: 3 | Status: SHIPPED | OUTPATIENT
Start: 2020-04-30 | End: 2020-08-20

## 2020-04-30 RX ORDER — METFORMIN HYDROCHLORIDE 500 MG/1
1000 TABLET, EXTENDED RELEASE ORAL
Qty: 60 TABLET | Refills: 6 | Status: SHIPPED | OUTPATIENT
Start: 2020-04-30 | End: 2020-11-02

## 2020-04-30 NOTE — PROGRESS NOTES
Established Patient - Audio Only Telehealth Visit     The patient location is: Home-[place]  Visit type: Virtual visit with audio only (telephone)   Time 17 min  The reason for the audio only service rather than synchronous audio and video virtual visit was related to technical difficulties or patient preference/necessity.     Each patient to whom I provide medical services by telemedicine is:  (1) informed of the relationship between the physician and patient and the respective role of any other health care provider with respect to management of the patient; and (2) notified that they may decline to receive medical services by telemedicine and may withdraw from such care at any time. Patient verbally consented to receive this service via voice-only telephone call.       CHIEF COMPLAINT: DM 2  64 year old being seen as a f/u. On metformin daily. No paresthesias. No change in vision.       PAST MEDICAL HISTORY/PAST SURGICAL HISTORY:  Reviewed in [facility]    SOCIAL HISTORY: Quit smoking 1970s. No alcohol    FAMILY HISTORY:  + DM 2. No known thyroid disease.     MEDICATIONS/ALLERGIES: The patient's MedCard has been updated and reviewed.      ROS:   Constitutional: weight decreased per patient   Eyes: No Blurry vision.   ENT: No dysphagia  Cardiovascular: Denies current anginal symptoms  Respiratory: Denies current respiratory difficulty  Gastrointestinal: Denies recent bowel disturbances  GenitoUrinary - No dysuria  Skin: No new skin rash  Neurologic: No focal neurologic complaints  Remainder ROS negative        PE:  Audio visit        ASSESSMENT/PLAN:  1. DM 2- only taking metformin daily. So will change to XR 1000 mg daily. Discussed risk benefits of statin and purpose for CVD risk reduction. Start atorvastatin 10      FOLLOWUP  F/U 6 months with me- CMP, A1c, lipid panel- Non fasting                   This service was not originating from a related E/M service provided within the previous 7 days nor will  to an E/M  service or procedure within the next 24 hours or my soonest available appointment.  Prevailing standard of care was able to be met in this audio-only visit.

## 2020-10-28 ENCOUNTER — LAB VISIT (OUTPATIENT)
Dept: LAB | Facility: HOSPITAL | Age: 65
End: 2020-10-28
Attending: INTERNAL MEDICINE
Payer: COMMERCIAL

## 2020-10-28 DIAGNOSIS — E11.9 TYPE 2 DIABETES MELLITUS WITHOUT COMPLICATION, WITHOUT LONG-TERM CURRENT USE OF INSULIN: ICD-10-CM

## 2020-10-28 LAB
ALBUMIN SERPL BCP-MCNC: 4.1 G/DL (ref 3.5–5.2)
ALP SERPL-CCNC: 62 U/L (ref 55–135)
ALT SERPL W/O P-5'-P-CCNC: 14 U/L (ref 10–44)
ANION GAP SERPL CALC-SCNC: 7 MMOL/L (ref 8–16)
AST SERPL-CCNC: 15 U/L (ref 10–40)
BILIRUB SERPL-MCNC: 0.4 MG/DL (ref 0.1–1)
BUN SERPL-MCNC: 28 MG/DL (ref 8–23)
CALCIUM SERPL-MCNC: 9 MG/DL (ref 8.7–10.5)
CHLORIDE SERPL-SCNC: 105 MMOL/L (ref 95–110)
CHOLEST SERPL-MCNC: 185 MG/DL (ref 120–199)
CHOLEST/HDLC SERPL: 4.2 {RATIO} (ref 2–5)
CO2 SERPL-SCNC: 29 MMOL/L (ref 23–29)
CREAT SERPL-MCNC: 1 MG/DL (ref 0.5–1.4)
EST. GFR  (AFRICAN AMERICAN): >60 ML/MIN/1.73 M^2
EST. GFR  (NON AFRICAN AMERICAN): >60 ML/MIN/1.73 M^2
ESTIMATED AVG GLUCOSE: 114 MG/DL (ref 68–131)
GLUCOSE SERPL-MCNC: 121 MG/DL (ref 70–110)
HBA1C MFR BLD HPLC: 5.6 % (ref 4–5.6)
HDLC SERPL-MCNC: 44 MG/DL (ref 40–75)
HDLC SERPL: 23.8 % (ref 20–50)
LDLC SERPL CALC-MCNC: 119 MG/DL (ref 63–159)
NONHDLC SERPL-MCNC: 141 MG/DL
POTASSIUM SERPL-SCNC: 4.1 MMOL/L (ref 3.5–5.1)
PROT SERPL-MCNC: 7 G/DL (ref 6–8.4)
SODIUM SERPL-SCNC: 141 MMOL/L (ref 136–145)
TRIGL SERPL-MCNC: 110 MG/DL (ref 30–150)

## 2020-10-28 PROCEDURE — 36415 COLL VENOUS BLD VENIPUNCTURE: CPT | Mod: PO

## 2020-10-28 PROCEDURE — 83036 HEMOGLOBIN GLYCOSYLATED A1C: CPT

## 2020-10-28 PROCEDURE — 80061 LIPID PANEL: CPT

## 2020-10-28 PROCEDURE — 80053 COMPREHEN METABOLIC PANEL: CPT

## 2020-11-02 ENCOUNTER — OFFICE VISIT (OUTPATIENT)
Dept: ENDOCRINOLOGY | Facility: CLINIC | Age: 65
End: 2020-11-02
Payer: COMMERCIAL

## 2020-11-02 VITALS
SYSTOLIC BLOOD PRESSURE: 106 MMHG | WEIGHT: 216.81 LBS | RESPIRATION RATE: 18 BRPM | DIASTOLIC BLOOD PRESSURE: 70 MMHG | BODY MASS INDEX: 30.35 KG/M2 | HEART RATE: 80 BPM | HEIGHT: 71 IN

## 2020-11-02 DIAGNOSIS — E11.9 TYPE 2 DIABETES MELLITUS WITHOUT COMPLICATION, WITHOUT LONG-TERM CURRENT USE OF INSULIN: Primary | ICD-10-CM

## 2020-11-02 PROCEDURE — 99213 OFFICE O/P EST LOW 20 MIN: CPT | Mod: S$GLB,,, | Performed by: INTERNAL MEDICINE

## 2020-11-02 PROCEDURE — 3008F PR BODY MASS INDEX (BMI) DOCUMENTED: ICD-10-PCS | Mod: CPTII,S$GLB,, | Performed by: INTERNAL MEDICINE

## 2020-11-02 PROCEDURE — 99999 PR PBB SHADOW E&M-EST. PATIENT-LVL III: CPT | Mod: PBBFAC,,, | Performed by: INTERNAL MEDICINE

## 2020-11-02 PROCEDURE — 3044F HG A1C LEVEL LT 7.0%: CPT | Mod: CPTII,S$GLB,, | Performed by: INTERNAL MEDICINE

## 2020-11-02 PROCEDURE — 3044F PR MOST RECENT HEMOGLOBIN A1C LEVEL <7.0%: ICD-10-PCS | Mod: CPTII,S$GLB,, | Performed by: INTERNAL MEDICINE

## 2020-11-02 PROCEDURE — 99213 PR OFFICE/OUTPT VISIT, EST, LEVL III, 20-29 MIN: ICD-10-PCS | Mod: S$GLB,,, | Performed by: INTERNAL MEDICINE

## 2020-11-02 PROCEDURE — 99999 PR PBB SHADOW E&M-EST. PATIENT-LVL III: ICD-10-PCS | Mod: PBBFAC,,, | Performed by: INTERNAL MEDICINE

## 2020-11-02 PROCEDURE — 3008F BODY MASS INDEX DOCD: CPT | Mod: CPTII,S$GLB,, | Performed by: INTERNAL MEDICINE

## 2020-11-02 NOTE — PROGRESS NOTES
CHIEF COMPLAINT: DM 2  65 y.o.  being seen as a f/u. On metformin 1000 mg daily. On statin. Has been watching diet closely. Has lost a significant amount of weight. No paresthesias. Has not had an eye exam this year. States he will make an appt as he needs his eye glass prescription checked. No N/V. Wanting to come off some his medication with the weight loss.       PAST MEDICAL HISTORY/PAST SURGICAL HISTORY:  Reviewed in [facility]    SOCIAL HISTORY: Quit smoking 1970s. No alcohol    FAMILY HISTORY:  + DM 2. No known thyroid disease.     MEDICATIONS/ALLERGIES: The patient's MedCard has been updated and reviewed.      ROS:   Constitutional: weight decreased  Eyes: No Blurry vision.   ENT: No dysphagia  Cardiovascular: Denies current anginal symptoms  Respiratory: Denies current respiratory difficulty  Gastrointestinal: Denies recent bowel disturbances  GenitoUrinary - No dysuria  Skin: No new skin rash  Neurologic: No focal neurologic complaints  Remainder ROS negative        PE:    GENERAL: Well developed, well nourished.  NECK: Supple, trachea midline, no palpable thyroid nodules  CHEST: Resp even and unlabored, CTA bilateral.  CARDIAC: RRR, S1, S2 heard, no murmurs, rubs, S3, or S4  FEET: 2 + pedal pulses. Normal monofilament. No cuts or abrasions.       Results for FELTON MAURICE (MRN [id number]) as of 11/2/2020 13:40   Ref. Range 10/28/2020 07:15   Sodium Latest Ref Range: 136 - 145 mmol/L 141   Potassium Latest Ref Range: 3.5 - 5.1 mmol/L 4.1   Chloride Latest Ref Range: 95 - 110 mmol/L 105   CO2 Latest Ref Range: 23 - 29 mmol/L 29   Anion Gap Latest Ref Range: 8 - 16 mmol/L 7 (L)   BUN Latest Ref Range: 8 - 23 mg/dL 28 (H)   Creatinine Latest Ref Range: 0.5 - 1.4 mg/dL 1.0   eGFR if non African American Latest Ref Range: >60 mL/min/1.73 m^2 >60.0   eGFR if African American Latest Ref Range: >60 mL/min/1.73 m^2 >60.0   Glucose Latest Ref Range: 70 - 110 mg/dL 121 (H)   Calcium Latest Ref Range: 8.7 - 10.5 mg/dL 9.0    Alkaline Phosphatase Latest Ref Range: 55 - 135 U/L 62   PROTEIN TOTAL Latest Ref Range: 6.0 - 8.4 g/dL 7.0   Albumin Latest Ref Range: 3.5 - 5.2 g/dL 4.1   BILIRUBIN TOTAL Latest Ref Range: 0.1 - 1.0 mg/dL 0.4   AST Latest Ref Range: 10 - 40 U/L 15   ALT Latest Ref Range: 10 - 44 U/L 14   Triglycerides Latest Ref Range: 30 - 150 mg/dL 110   Cholesterol Latest Ref Range: 120 - 199 mg/dL 185   HDL Latest Ref Range: 40 - 75 mg/dL 44   HDL/Cholesterol Ratio Latest Ref Range: 20.0 - 50.0 % 23.8   LDL Cholesterol External Latest Ref Range: 63.0 - 159.0 mg/dL 119.0   Non-HDL Cholesterol Latest Units: mg/dL 141   Total Cholesterol/HDL Ratio Latest Ref Range: 2.0 - 5.0  4.2   Hemoglobin A1C External Latest Ref Range: 4.0 - 5.6 % 5.6   Estimated Avg Glucose Latest Ref Range: 68 - 131 mg/dL 114         ASSESSMENT/PLAN:  1. DM 2- BG significantly improved. Has lost a significant amount of weight with diet. Wanting to stop metformin to see how he does on diet alone. WIll stop metformin. Encouraged exercise. Discussed yearly eye exam and offered to get one scheduled but he prefers to set it up. Advised having records faxed.  Discussed flu vaccine. Discussed risks/benefits and reduction in complication risk. He states he will think about it but never gets flu vaccine. Continue statin.       FOLLOWUP  3 months- A1c  F/U 6 months with me- CMP, A1c, FLP, urine micro

## 2021-02-06 ENCOUNTER — LAB VISIT (OUTPATIENT)
Dept: LAB | Facility: HOSPITAL | Age: 66
End: 2021-02-06
Attending: INTERNAL MEDICINE
Payer: COMMERCIAL

## 2021-02-06 DIAGNOSIS — E11.9 TYPE 2 DIABETES MELLITUS WITHOUT COMPLICATION, WITHOUT LONG-TERM CURRENT USE OF INSULIN: ICD-10-CM

## 2021-02-06 LAB
ESTIMATED AVG GLUCOSE: 117 MG/DL (ref 68–131)
HBA1C MFR BLD: 5.7 % (ref 4–5.6)

## 2021-02-06 PROCEDURE — 36415 COLL VENOUS BLD VENIPUNCTURE: CPT | Mod: PO

## 2021-02-06 PROCEDURE — 83036 HEMOGLOBIN GLYCOSYLATED A1C: CPT

## 2021-02-08 ENCOUNTER — PATIENT MESSAGE (OUTPATIENT)
Dept: ENDOCRINOLOGY | Facility: CLINIC | Age: 66
End: 2021-02-08

## 2021-03-20 ENCOUNTER — OFFICE VISIT (OUTPATIENT)
Dept: URGENT CARE | Facility: CLINIC | Age: 66
End: 2021-03-20
Payer: COMMERCIAL

## 2021-03-20 VITALS
BODY MASS INDEX: 30.24 KG/M2 | TEMPERATURE: 98 F | HEIGHT: 71 IN | RESPIRATION RATE: 16 BRPM | HEART RATE: 80 BPM | OXYGEN SATURATION: 96 % | SYSTOLIC BLOOD PRESSURE: 120 MMHG | WEIGHT: 216 LBS | DIASTOLIC BLOOD PRESSURE: 78 MMHG

## 2021-03-20 DIAGNOSIS — H57.8A9 SENSATION OF FOREIGN BODY IN EYE: Primary | ICD-10-CM

## 2021-03-20 PROCEDURE — 99203 OFFICE O/P NEW LOW 30 MIN: CPT | Mod: S$GLB,,, | Performed by: PHYSICIAN ASSISTANT

## 2021-03-20 PROCEDURE — 3008F BODY MASS INDEX DOCD: CPT | Mod: CPTII,S$GLB,, | Performed by: PHYSICIAN ASSISTANT

## 2021-03-20 PROCEDURE — 3008F PR BODY MASS INDEX (BMI) DOCUMENTED: ICD-10-PCS | Mod: CPTII,S$GLB,, | Performed by: PHYSICIAN ASSISTANT

## 2021-03-20 PROCEDURE — 99203 PR OFFICE/OUTPT VISIT, NEW, LEVL III, 30-44 MIN: ICD-10-PCS | Mod: S$GLB,,, | Performed by: PHYSICIAN ASSISTANT

## 2021-03-20 RX ORDER — CIPROFLOXACIN HYDROCHLORIDE 3 MG/ML
1 SOLUTION/ DROPS OPHTHALMIC 4 TIMES DAILY
Qty: 10 ML | Refills: 0 | Status: SHIPPED | OUTPATIENT
Start: 2021-03-20 | End: 2021-03-25

## 2021-04-09 LAB
LEFT EYE DM RETINOPATHY: NEGATIVE
RIGHT EYE DM RETINOPATHY: NEGATIVE

## 2021-04-24 ENCOUNTER — LAB VISIT (OUTPATIENT)
Dept: LAB | Facility: HOSPITAL | Age: 66
End: 2021-04-24
Attending: INTERNAL MEDICINE
Payer: COMMERCIAL

## 2021-04-24 DIAGNOSIS — E11.9 TYPE 2 DIABETES MELLITUS WITHOUT COMPLICATION, WITHOUT LONG-TERM CURRENT USE OF INSULIN: ICD-10-CM

## 2021-04-24 LAB
ALBUMIN/CREAT UR: 3.8 UG/MG (ref 0–30)
CREAT UR-MCNC: 104 MG/DL (ref 23–375)
MICROALBUMIN UR DL<=1MG/L-MCNC: 4 UG/ML

## 2021-04-24 PROCEDURE — 82570 ASSAY OF URINE CREATININE: CPT | Performed by: INTERNAL MEDICINE

## 2021-04-24 PROCEDURE — 82043 UR ALBUMIN QUANTITATIVE: CPT | Performed by: INTERNAL MEDICINE

## 2021-05-03 ENCOUNTER — OFFICE VISIT (OUTPATIENT)
Dept: ENDOCRINOLOGY | Facility: CLINIC | Age: 66
End: 2021-05-03
Payer: COMMERCIAL

## 2021-05-03 VITALS
OXYGEN SATURATION: 99 % | SYSTOLIC BLOOD PRESSURE: 108 MMHG | WEIGHT: 224 LBS | HEIGHT: 71 IN | BODY MASS INDEX: 31.36 KG/M2 | HEART RATE: 81 BPM | DIASTOLIC BLOOD PRESSURE: 60 MMHG

## 2021-05-03 DIAGNOSIS — E11.9 TYPE 2 DIABETES MELLITUS WITHOUT COMPLICATION, WITHOUT LONG-TERM CURRENT USE OF INSULIN: Primary | ICD-10-CM

## 2021-05-03 PROCEDURE — 3044F HG A1C LEVEL LT 7.0%: CPT | Mod: CPTII,S$GLB,, | Performed by: INTERNAL MEDICINE

## 2021-05-03 PROCEDURE — 99999 PR PBB SHADOW E&M-EST. PATIENT-LVL III: CPT | Mod: PBBFAC,,, | Performed by: INTERNAL MEDICINE

## 2021-05-03 PROCEDURE — 1126F PR PAIN SEVERITY QUANTIFIED, NO PAIN PRESENT: ICD-10-PCS | Mod: S$GLB,,, | Performed by: INTERNAL MEDICINE

## 2021-05-03 PROCEDURE — 99213 PR OFFICE/OUTPT VISIT, EST, LEVL III, 20-29 MIN: ICD-10-PCS | Mod: S$GLB,,, | Performed by: INTERNAL MEDICINE

## 2021-05-03 PROCEDURE — 3008F PR BODY MASS INDEX (BMI) DOCUMENTED: ICD-10-PCS | Mod: CPTII,S$GLB,, | Performed by: INTERNAL MEDICINE

## 2021-05-03 PROCEDURE — 1126F AMNT PAIN NOTED NONE PRSNT: CPT | Mod: S$GLB,,, | Performed by: INTERNAL MEDICINE

## 2021-05-03 PROCEDURE — 99999 PR PBB SHADOW E&M-EST. PATIENT-LVL III: ICD-10-PCS | Mod: PBBFAC,,, | Performed by: INTERNAL MEDICINE

## 2021-05-03 PROCEDURE — 99213 OFFICE O/P EST LOW 20 MIN: CPT | Mod: S$GLB,,, | Performed by: INTERNAL MEDICINE

## 2021-05-03 PROCEDURE — 3008F BODY MASS INDEX DOCD: CPT | Mod: CPTII,S$GLB,, | Performed by: INTERNAL MEDICINE

## 2021-05-03 PROCEDURE — 3044F PR MOST RECENT HEMOGLOBIN A1C LEVEL <7.0%: ICD-10-PCS | Mod: CPTII,S$GLB,, | Performed by: INTERNAL MEDICINE

## 2021-05-10 ENCOUNTER — PATIENT OUTREACH (OUTPATIENT)
Dept: ADMINISTRATIVE | Facility: HOSPITAL | Age: 66
End: 2021-05-10

## 2021-11-09 ENCOUNTER — OFFICE VISIT (OUTPATIENT)
Dept: URGENT CARE | Facility: CLINIC | Age: 66
End: 2021-11-09
Payer: COMMERCIAL

## 2021-11-09 VITALS
BODY MASS INDEX: 31.22 KG/M2 | RESPIRATION RATE: 16 BRPM | TEMPERATURE: 99 F | HEIGHT: 71 IN | OXYGEN SATURATION: 96 % | HEART RATE: 102 BPM | DIASTOLIC BLOOD PRESSURE: 73 MMHG | SYSTOLIC BLOOD PRESSURE: 121 MMHG | WEIGHT: 223 LBS

## 2021-11-09 DIAGNOSIS — S69.92XA INJURY OF LEFT THUMB, INITIAL ENCOUNTER: ICD-10-CM

## 2021-11-09 DIAGNOSIS — S63.602A SPRAIN OF LEFT THUMB, UNSPECIFIED SITE OF DIGIT, INITIAL ENCOUNTER: Primary | ICD-10-CM

## 2021-11-09 PROCEDURE — 99213 OFFICE O/P EST LOW 20 MIN: CPT | Mod: S$GLB,,, | Performed by: PHYSICIAN ASSISTANT

## 2021-11-09 PROCEDURE — 73130 X-RAY EXAM OF HAND: CPT | Mod: LT,S$GLB,, | Performed by: RADIOLOGY

## 2021-11-09 PROCEDURE — 3044F PR MOST RECENT HEMOGLOBIN A1C LEVEL <7.0%: ICD-10-PCS | Mod: CPTII,S$GLB,, | Performed by: PHYSICIAN ASSISTANT

## 2021-11-09 PROCEDURE — 99213 PR OFFICE/OUTPT VISIT, EST, LEVL III, 20-29 MIN: ICD-10-PCS | Mod: S$GLB,,, | Performed by: PHYSICIAN ASSISTANT

## 2021-11-09 PROCEDURE — 3066F PR DOCUMENTATION OF TREATMENT FOR NEPHROPATHY: ICD-10-PCS | Mod: CPTII,S$GLB,, | Performed by: PHYSICIAN ASSISTANT

## 2021-11-09 PROCEDURE — 1160F PR REVIEW ALL MEDS BY PRESCRIBER/CLIN PHARMACIST DOCUMENTED: ICD-10-PCS | Mod: CPTII,S$GLB,, | Performed by: PHYSICIAN ASSISTANT

## 2021-11-09 PROCEDURE — 73130 XR HAND COMPLETE 3 VIEW LEFT: ICD-10-PCS | Mod: LT,S$GLB,, | Performed by: RADIOLOGY

## 2021-11-09 PROCEDURE — 1159F MED LIST DOCD IN RCRD: CPT | Mod: CPTII,S$GLB,, | Performed by: PHYSICIAN ASSISTANT

## 2021-11-09 PROCEDURE — 3061F PR NEG MICROALBUMINURIA RESULT DOCUMENTED/REVIEW: ICD-10-PCS | Mod: CPTII,S$GLB,, | Performed by: PHYSICIAN ASSISTANT

## 2021-11-09 PROCEDURE — 3008F BODY MASS INDEX DOCD: CPT | Mod: CPTII,S$GLB,, | Performed by: PHYSICIAN ASSISTANT

## 2021-11-09 PROCEDURE — 3044F HG A1C LEVEL LT 7.0%: CPT | Mod: CPTII,S$GLB,, | Performed by: PHYSICIAN ASSISTANT

## 2021-11-09 PROCEDURE — 1159F PR MEDICATION LIST DOCUMENTED IN MEDICAL RECORD: ICD-10-PCS | Mod: CPTII,S$GLB,, | Performed by: PHYSICIAN ASSISTANT

## 2021-11-09 PROCEDURE — 3066F NEPHROPATHY DOC TX: CPT | Mod: CPTII,S$GLB,, | Performed by: PHYSICIAN ASSISTANT

## 2021-11-09 PROCEDURE — 1160F RVW MEDS BY RX/DR IN RCRD: CPT | Mod: CPTII,S$GLB,, | Performed by: PHYSICIAN ASSISTANT

## 2021-11-09 PROCEDURE — 3078F PR MOST RECENT DIASTOLIC BLOOD PRESSURE < 80 MM HG: ICD-10-PCS | Mod: CPTII,S$GLB,, | Performed by: PHYSICIAN ASSISTANT

## 2021-11-09 PROCEDURE — 3061F NEG MICROALBUMINURIA REV: CPT | Mod: CPTII,S$GLB,, | Performed by: PHYSICIAN ASSISTANT

## 2021-11-09 PROCEDURE — 3074F SYST BP LT 130 MM HG: CPT | Mod: CPTII,S$GLB,, | Performed by: PHYSICIAN ASSISTANT

## 2021-11-09 PROCEDURE — 3074F PR MOST RECENT SYSTOLIC BLOOD PRESSURE < 130 MM HG: ICD-10-PCS | Mod: CPTII,S$GLB,, | Performed by: PHYSICIAN ASSISTANT

## 2021-11-09 PROCEDURE — 3008F PR BODY MASS INDEX (BMI) DOCUMENTED: ICD-10-PCS | Mod: CPTII,S$GLB,, | Performed by: PHYSICIAN ASSISTANT

## 2021-11-09 PROCEDURE — 3078F DIAST BP <80 MM HG: CPT | Mod: CPTII,S$GLB,, | Performed by: PHYSICIAN ASSISTANT

## 2022-01-03 ENCOUNTER — PATIENT MESSAGE (OUTPATIENT)
Dept: CARDIOLOGY | Facility: CLINIC | Age: 67
End: 2022-01-03
Payer: COMMERCIAL

## 2022-01-05 ENCOUNTER — OFFICE VISIT (OUTPATIENT)
Dept: URGENT CARE | Facility: CLINIC | Age: 67
End: 2022-01-05
Payer: COMMERCIAL

## 2022-01-05 VITALS
HEIGHT: 71 IN | WEIGHT: 223 LBS | TEMPERATURE: 98 F | OXYGEN SATURATION: 98 % | RESPIRATION RATE: 16 BRPM | SYSTOLIC BLOOD PRESSURE: 144 MMHG | DIASTOLIC BLOOD PRESSURE: 78 MMHG | BODY MASS INDEX: 31.22 KG/M2 | HEART RATE: 77 BPM

## 2022-01-05 DIAGNOSIS — M25.511 ACUTE PAIN OF RIGHT SHOULDER: ICD-10-CM

## 2022-01-05 DIAGNOSIS — R07.81 RIB PAIN ON RIGHT SIDE: Primary | ICD-10-CM

## 2022-01-05 DIAGNOSIS — K64.8 OTHER HEMORRHOIDS: ICD-10-CM

## 2022-01-05 DIAGNOSIS — R10.11 RUQ PAIN: ICD-10-CM

## 2022-01-05 LAB
BILIRUB UR QL STRIP: NEGATIVE
GLUCOSE UR QL STRIP: NEGATIVE
KETONES UR QL STRIP: NEGATIVE
LEUKOCYTE ESTERASE UR QL STRIP: NEGATIVE
PH, POC UA: 6 (ref 5–8)
POC BLOOD, URINE: NEGATIVE
POC NITRATES, URINE: NEGATIVE
PROT UR QL STRIP: NEGATIVE
SP GR UR STRIP: 1.02 (ref 1–1.03)
UROBILINOGEN UR STRIP-ACNC: NORMAL (ref 0.3–2.2)

## 2022-01-05 PROCEDURE — 81003 POCT URINALYSIS, DIPSTICK, AUTOMATED, W/O SCOPE: ICD-10-PCS | Mod: QW,S$GLB,, | Performed by: FAMILY MEDICINE

## 2022-01-05 PROCEDURE — 99214 OFFICE O/P EST MOD 30 MIN: CPT | Mod: 25,S$GLB,, | Performed by: FAMILY MEDICINE

## 2022-01-05 PROCEDURE — 1160F PR REVIEW ALL MEDS BY PRESCRIBER/CLIN PHARMACIST DOCUMENTED: ICD-10-PCS | Mod: CPTII,S$GLB,, | Performed by: FAMILY MEDICINE

## 2022-01-05 PROCEDURE — 1159F MED LIST DOCD IN RCRD: CPT | Mod: CPTII,S$GLB,, | Performed by: FAMILY MEDICINE

## 2022-01-05 PROCEDURE — 99214 PR OFFICE/OUTPT VISIT, EST, LEVL IV, 30-39 MIN: ICD-10-PCS | Mod: 25,S$GLB,, | Performed by: FAMILY MEDICINE

## 2022-01-05 PROCEDURE — 3008F BODY MASS INDEX DOCD: CPT | Mod: CPTII,S$GLB,, | Performed by: FAMILY MEDICINE

## 2022-01-05 PROCEDURE — 3078F DIAST BP <80 MM HG: CPT | Mod: CPTII,S$GLB,, | Performed by: FAMILY MEDICINE

## 2022-01-05 PROCEDURE — 3008F PR BODY MASS INDEX (BMI) DOCUMENTED: ICD-10-PCS | Mod: CPTII,S$GLB,, | Performed by: FAMILY MEDICINE

## 2022-01-05 PROCEDURE — 3077F SYST BP >= 140 MM HG: CPT | Mod: CPTII,S$GLB,, | Performed by: FAMILY MEDICINE

## 2022-01-05 PROCEDURE — 3077F PR MOST RECENT SYSTOLIC BLOOD PRESSURE >= 140 MM HG: ICD-10-PCS | Mod: CPTII,S$GLB,, | Performed by: FAMILY MEDICINE

## 2022-01-05 PROCEDURE — 1159F PR MEDICATION LIST DOCUMENTED IN MEDICAL RECORD: ICD-10-PCS | Mod: CPTII,S$GLB,, | Performed by: FAMILY MEDICINE

## 2022-01-05 PROCEDURE — 81003 URINALYSIS AUTO W/O SCOPE: CPT | Mod: QW,S$GLB,, | Performed by: FAMILY MEDICINE

## 2022-01-05 PROCEDURE — 1160F RVW MEDS BY RX/DR IN RCRD: CPT | Mod: CPTII,S$GLB,, | Performed by: FAMILY MEDICINE

## 2022-01-05 PROCEDURE — 3078F PR MOST RECENT DIASTOLIC BLOOD PRESSURE < 80 MM HG: ICD-10-PCS | Mod: CPTII,S$GLB,, | Performed by: FAMILY MEDICINE

## 2022-01-05 PROCEDURE — 71101 X-RAY EXAM UNILAT RIBS/CHEST: CPT | Mod: RT,S$GLB,, | Performed by: RADIOLOGY

## 2022-01-05 PROCEDURE — 71101 XR RIBS MIN 3 VIEWS W/ PA CHEST RIGHT: ICD-10-PCS | Mod: RT,S$GLB,, | Performed by: RADIOLOGY

## 2022-01-05 RX ORDER — METHYLPREDNISOLONE 4 MG/1
TABLET ORAL
Qty: 1 EACH | Refills: 0 | Status: SHIPPED | OUTPATIENT
Start: 2022-01-05 | End: 2022-01-19 | Stop reason: ALTCHOICE

## 2022-01-05 RX ORDER — HYDROCORTISONE 25 MG/G
CREAM TOPICAL 2 TIMES DAILY
Qty: 28 G | Refills: 2 | Status: SHIPPED | OUTPATIENT
Start: 2022-01-05

## 2022-01-05 NOTE — PROGRESS NOTES
"Subjective:       Patient ID: Randolph Villeda is a 66 y.o. male.    Vitals:  height is 5' 11" (1.803 m) and weight is 101.2 kg (223 lb). His temperature is 97.7 °F (36.5 °C). His blood pressure is 144/78 (abnormal) and his pulse is 77. His respiration is 16 and oxygen saturation is 98%.     Chief Complaint: Chest Pain    Right rib pain in front for 1 week. Patient only come when patient is standing and when using the bathroom. Pain 9/10    Chest Pain   This is a new problem. The current episode started in the past 7 days. The problem occurs constantly. The problem has been unchanged. The pain is at a severity of 9/10. The pain is severe. The pain is aggravated by walking. He has tried nothing for the symptoms. The treatment provided no relief.       Cardiovascular: Positive for chest pain.       Objective:      Physical Exam    external hemorrhoid  Left shoulder- pain with alberto, neers and empty can  Right upper abd/lower ride pain with movt. Pt describes pain to be 9/10, but I could not illicit sucha a response with deep palpation to RUQ and fully grasping inf rib cage  Assessment:       1. Rib pain on right side    2. Acute pain of right shoulder    3. Other hemorrhoids    4. RUQ pain          Plan:       Inc gas pattern an stool on xray- pt has not has cscope in 20 yrs. Advised to f/u with PCP for further eval if pain persists.   Rib pain on right side  -     POCT Urinalysis, Dipstick, Automated, W/O Scope  -     XR RIB RIGHT W/ PA CHEST; Future; Expected date: 01/05/2022    Acute pain of right shoulder  -     Ambulatory referral/consult to Orthopedics    Other hemorrhoids    RUQ pain  -     Ambulatory referral/consult to Gastroenterology    Other orders  -     hydrocortisone 2.5 % cream; Apply topically 2 (two) times daily.  Dispense: 28 g; Refill: 2  -     methylPREDNISolone (MEDROL DOSEPACK) 4 mg tablet; Dispense one claude. use as directed  Dispense: 1 each; Refill: 0                   "

## 2022-01-10 ENCOUNTER — TELEPHONE (OUTPATIENT)
Dept: GASTROENTEROLOGY | Facility: CLINIC | Age: 67
End: 2022-01-10
Payer: COMMERCIAL

## 2022-01-10 NOTE — TELEPHONE ENCOUNTER
----- Message from Irina Morton sent at 1/10/2022 12:35 PM CST -----  Regarding: sooner appt 03/13  Type:  Sooner Apoointment Request    Caller is requesting a sooner appointment.  Caller declined first available appointment listed below.  Caller will not accept being placed on the waitlist and is requesting a message be sent to doctor.    Name of Caller:  pt  When is the first available appointment?  03/13  Symptoms:  rt abdominal pain  Best Call Back Number: [phone] (home)     Additional Information:

## 2022-01-19 ENCOUNTER — LAB VISIT (OUTPATIENT)
Dept: LAB | Facility: HOSPITAL | Age: 67
End: 2022-01-19
Attending: NURSE PRACTITIONER
Payer: COMMERCIAL

## 2022-01-19 ENCOUNTER — OFFICE VISIT (OUTPATIENT)
Dept: GASTROENTEROLOGY | Facility: CLINIC | Age: 67
End: 2022-01-19
Payer: COMMERCIAL

## 2022-01-19 VITALS — HEIGHT: 71 IN | WEIGHT: 230 LBS | BODY MASS INDEX: 32.2 KG/M2

## 2022-01-19 DIAGNOSIS — Z01.818 PRE-OP TESTING: ICD-10-CM

## 2022-01-19 DIAGNOSIS — Z12.11 SCREENING FOR COLON CANCER: ICD-10-CM

## 2022-01-19 DIAGNOSIS — R10.11 RUQ ABDOMINAL PAIN: ICD-10-CM

## 2022-01-19 DIAGNOSIS — R10.11 RUQ ABDOMINAL PAIN: Primary | ICD-10-CM

## 2022-01-19 LAB
ALBUMIN SERPL BCP-MCNC: 4.2 G/DL (ref 3.5–5.2)
ALP SERPL-CCNC: 69 U/L (ref 55–135)
ALT SERPL W/O P-5'-P-CCNC: 20 U/L (ref 10–44)
AST SERPL-CCNC: 17 U/L (ref 10–40)
BILIRUB DIRECT SERPL-MCNC: 0.1 MG/DL (ref 0.1–0.3)
BILIRUB SERPL-MCNC: 0.5 MG/DL (ref 0.1–1)
LIPASE SERPL-CCNC: 25 U/L (ref 4–60)
PROT SERPL-MCNC: 7.4 G/DL (ref 6–8.4)

## 2022-01-19 PROCEDURE — 3288F FALL RISK ASSESSMENT DOCD: CPT | Mod: CPTII,S$GLB,, | Performed by: NURSE PRACTITIONER

## 2022-01-19 PROCEDURE — 99999 PR PBB SHADOW E&M-EST. PATIENT-LVL III: CPT | Mod: PBBFAC,,, | Performed by: NURSE PRACTITIONER

## 2022-01-19 PROCEDURE — 1126F AMNT PAIN NOTED NONE PRSNT: CPT | Mod: CPTII,S$GLB,, | Performed by: NURSE PRACTITIONER

## 2022-01-19 PROCEDURE — 1159F PR MEDICATION LIST DOCUMENTED IN MEDICAL RECORD: ICD-10-PCS | Mod: CPTII,S$GLB,, | Performed by: NURSE PRACTITIONER

## 2022-01-19 PROCEDURE — 83690 ASSAY OF LIPASE: CPT | Performed by: NURSE PRACTITIONER

## 2022-01-19 PROCEDURE — 99204 PR OFFICE/OUTPT VISIT, NEW, LEVL IV, 45-59 MIN: ICD-10-PCS | Mod: S$GLB,,, | Performed by: NURSE PRACTITIONER

## 2022-01-19 PROCEDURE — 3288F PR FALLS RISK ASSESSMENT DOCUMENTED: ICD-10-PCS | Mod: CPTII,S$GLB,, | Performed by: NURSE PRACTITIONER

## 2022-01-19 PROCEDURE — 1159F MED LIST DOCD IN RCRD: CPT | Mod: CPTII,S$GLB,, | Performed by: NURSE PRACTITIONER

## 2022-01-19 PROCEDURE — 1160F RVW MEDS BY RX/DR IN RCRD: CPT | Mod: CPTII,S$GLB,, | Performed by: NURSE PRACTITIONER

## 2022-01-19 PROCEDURE — 1126F PR PAIN SEVERITY QUANTIFIED, NO PAIN PRESENT: ICD-10-PCS | Mod: CPTII,S$GLB,, | Performed by: NURSE PRACTITIONER

## 2022-01-19 PROCEDURE — 99204 OFFICE O/P NEW MOD 45 MIN: CPT | Mod: S$GLB,,, | Performed by: NURSE PRACTITIONER

## 2022-01-19 PROCEDURE — 1101F PT FALLS ASSESS-DOCD LE1/YR: CPT | Mod: CPTII,S$GLB,, | Performed by: NURSE PRACTITIONER

## 2022-01-19 PROCEDURE — 36415 COLL VENOUS BLD VENIPUNCTURE: CPT | Mod: PO | Performed by: NURSE PRACTITIONER

## 2022-01-19 PROCEDURE — 80076 HEPATIC FUNCTION PANEL: CPT | Performed by: NURSE PRACTITIONER

## 2022-01-19 PROCEDURE — 1101F PR PT FALLS ASSESS DOC 0-1 FALLS W/OUT INJ PAST YR: ICD-10-PCS | Mod: CPTII,S$GLB,, | Performed by: NURSE PRACTITIONER

## 2022-01-19 PROCEDURE — 1160F PR REVIEW ALL MEDS BY PRESCRIBER/CLIN PHARMACIST DOCUMENTED: ICD-10-PCS | Mod: CPTII,S$GLB,, | Performed by: NURSE PRACTITIONER

## 2022-01-19 PROCEDURE — 3008F PR BODY MASS INDEX (BMI) DOCUMENTED: ICD-10-PCS | Mod: CPTII,S$GLB,, | Performed by: NURSE PRACTITIONER

## 2022-01-19 PROCEDURE — 99999 PR PBB SHADOW E&M-EST. PATIENT-LVL III: ICD-10-PCS | Mod: PBBFAC,,, | Performed by: NURSE PRACTITIONER

## 2022-01-19 PROCEDURE — 3008F BODY MASS INDEX DOCD: CPT | Mod: CPTII,S$GLB,, | Performed by: NURSE PRACTITIONER

## 2022-01-19 RX ORDER — OMEPRAZOLE 40 MG/1
40 CAPSULE, DELAYED RELEASE ORAL DAILY
Qty: 30 CAPSULE | Refills: 2 | Status: SHIPPED | OUTPATIENT
Start: 2022-01-19 | End: 2022-04-20

## 2022-01-19 NOTE — PATIENT INSTRUCTIONS
Patient Education       Abdominal Pain, Adult ED   General Information   You came to the Emergency Department (ED) for abdominal or belly pain. The doctor feels that the risk of a serious cause for your belly pain is low.  Many things can cause belly pain. Some are serious things like bleeding or an infection. Less serious things, like an upset stomach, can also cause belly pain.  The doctors may not be able to find all serious causes of belly pain the first time they see you. It is important that you follow up with your doctor. You may be waiting on some test results. The staff will notify you if there are concerning results.  What care is needed at home?   · Call your regular doctor to let them know you were in the ED. Make a follow-up appointment if you were told to.  · Keep a diary about your pain to help your doctor learn more about the cause. Write down the foods you eat to see if they may be the cause of your pain. Also write down what you were doing before and during the pain.  · Eat small meals more often. Eat more fiber if hard stools are a problem.  · Avoid foods or drinks that make your pain worse. Some people are bothered by:  ? Drinks that are fizzy or have caffeine.  ? Fried, greasy, or fatty foods.  ? Orange juice.  ? Milk or cheese can bother some peoples stomach as well.  · When you have pain, you can:  ? Try to have a bowel movement.  ? Lie down and rest.  ? Avoid solid foods for a few hours. If you are hungry, try liquids like broth or water. When you feel better, try mild foods like rice, crackers, bananas, applesauce, or toast.  · Dont take over-the-counter medicines, such as antacids or laxatives, unless they are ordered.  · Check with the doctor before you take any herbal medicines or supplements.  When do I need to get emergency help?   · Call for an ambulance right away if:   ? You have sudden severe belly pain, or the pain is constant.  ? You have trouble breathing or chest pain along  with your belly pain.  ? You start throwing up blood or pass a lot of blood in your stool.  ? Your belly becomes very hard or swollen.  ? You get a fever 102.2°F (39°C) or higher or shaking chills.  · Return to the ED if:   ? You have signs of severe fluid loss, such as:  § No urine for more than 8 hours.  § You feel very light-headed or like you are going to pass out.  § You feel weak, like you are going to fall.  ? Your pain gets worse, comes more often or moves to one area of the belly  ? You have an upset stomach or throwing up that isnt getting better and are having trouble keeping down food and drink.  ? Your stools are black or tar colored.  When do I need to call the doctor?   · If the pain is not gone or getting better in 1 to 2 days.  · You have a fever of 100.4°F (38°C) or higher, chills.  · You develop early signs of fluid loss, such as:  ? Your urine is very dark colored.  ? Your mouth is dry.  ? You have muscle cramps.  ? You have a lack of energy.  ? You feel light-headed when you get up.  · You have pain with passing urine or have blood in your urine.  · Your stools have a small amount (less than 1 teaspoon or 5 mL) of blood in them.  · You feel that something is not right in your belly.  · You have new or worsening symptoms.  Last Reviewed Date   2021-05-07  Consumer Information Use and Disclaimer   This information is not specific medical advice and does not replace information you receive from your health care provider. This is only a brief summary of general information. It does NOT include all information about conditions, illnesses, injuries, tests, procedures, treatments, therapies, discharge instructions or life-style choices that may apply to you. You must talk with your health care provider for complete information about your health and treatment options. This information should not be used to decide whether or not to accept your health care providers advice, instructions or  recommendations. Only your health care provider has the knowledge and training to provide advice that is right for you.  Copyright   Copyright © 2021 [employer] and its affiliates and/or licensors. All rights reserved.

## 2022-01-19 NOTE — PROGRESS NOTES
"Subjective:       Patient ID: Randolph Villeda is a 66 y.o. male, Body mass index is 32.08 kg/m².    Chief Complaint: Abdominal Pain      Patient is new to me. Referred by Dr. Montilla for RUQ pain.     Abdominal Pain  This is a new problem. The current episode started 1 to 4 weeks ago (Started ~ 3-4 weeks ago). The onset quality is sudden. The problem occurs intermittently. Duration: lasts for seconds. The problem has been unchanged. The pain is located in the RUQ. The pain is severe. Quality: describes as "muscle pulling" The abdominal pain does not radiate. Pertinent negatives include no anorexia, belching, constipation (bowel movements are once per day), diarrhea, dysuria, fever, flatus, frequency, hematochezia, melena, nausea, vomiting or weight loss. The pain is aggravated by movement and certain positions (aggravated by standing up). The pain is relieved by nothing. Treatments tried: Past: Medrol dose pack- no improvement. Prior diagnostic workup includes GI consult (X-Ray of abdomen). There is no history of abdominal surgery, colon cancer, Crohn's disease, gallstones, GERD, irritable bowel syndrome, pancreatitis, PUD or ulcerative colitis.     Review of Systems   Constitutional: Negative for appetite change, fever, unexpected weight change and weight loss.   HENT: Negative for trouble swallowing.    Respiratory: Negative for cough and shortness of breath.    Cardiovascular: Negative for chest pain.   Gastrointestinal: Positive for abdominal pain. Negative for abdominal distention, anal bleeding, anorexia, blood in stool, constipation (bowel movements are once per day), diarrhea, flatus, hematochezia, melena, nausea, rectal pain and vomiting.   Genitourinary: Negative for difficulty urinating, dysuria and frequency.   Musculoskeletal: Negative for gait problem.   Skin: Negative for rash.   Neurological: Negative for speech difficulty.   Psychiatric/Behavioral: Negative for confusion.       Past Medical " History:   Diagnosis Date    Arthritis     Neck pain     Type 2 diabetes mellitus       Past Surgical History:   Procedure Laterality Date    COLONOSCOPY  2002 ?    unremarkable per pt report    EPIDURAL BLOCK INJECTION      cervical    INJECTION OF ANESTHETIC AGENT AROUND MEDIAL BRANCH NERVES INNERVATING CERVICAL FACET JOINT Right 8/31/2018    Procedure: MEDIAL BRANCH, CERVICAL BLOCK C3-C4, C4-C5, C5-C6;  Surgeon: Irineo Shepherd MD;  Location: [facility];  Service: Pain Management;  Laterality: Right;    JOINT REPLACEMENT Left 2018    hip      Family History   Problem Relation Age of Onset    Heart disease Father     Hyperlipidemia Father     Diabetes Father     Colon cancer Neg Hx     Esophageal cancer Neg Hx     Stomach cancer Neg Hx       Wt Readings from Last 10 Encounters:   01/19/22 104.3 kg (230 lb)   01/05/22 101.2 kg (223 lb)   11/09/21 101.2 kg (223 lb)   05/03/21 101.6 kg (224 lb)   03/20/21 98 kg (216 lb)   11/02/20 98.4 kg (216 lb 13.2 oz)   10/14/19 108 kg (238 lb)   07/17/19 117.2 kg (258 lb 6.1 oz)   07/10/19 117.9 kg (259 lb 14.8 oz)   07/05/19 117.8 kg (259 lb 13 oz)     Lab Results   Component Value Date    WBC 6.26 07/02/2019    HGB 15.2 07/02/2019    HCT 45.1 07/02/2019    MCV 88 07/02/2019     07/02/2019     CMP  Sodium   Date Value Ref Range Status   04/24/2021 140 136 - 145 mmol/L Final     Potassium   Date Value Ref Range Status   04/24/2021 4.2 3.5 - 5.1 mmol/L Final     Chloride   Date Value Ref Range Status   04/24/2021 107 95 - 110 mmol/L Final     CO2   Date Value Ref Range Status   04/24/2021 27 23 - 29 mmol/L Final     Glucose   Date Value Ref Range Status   04/24/2021 123 (H) 70 - 110 mg/dL Final     BUN   Date Value Ref Range Status   04/24/2021 28 (H) 8 - 23 mg/dL Final     Creatinine   Date Value Ref Range Status   04/24/2021 0.9 0.5 - 1.4 mg/dL Final     Calcium   Date Value Ref Range Status   04/24/2021 8.9 8.7 - 10.5 mg/dL Final     Total Protein   Date  Value Ref Range Status   04/24/2021 7.3 6.0 - 8.4 g/dL Final     Albumin   Date Value Ref Range Status   04/24/2021 4.1 3.5 - 5.2 g/dL Final     Total Bilirubin   Date Value Ref Range Status   04/24/2021 0.5 0.1 - 1.0 mg/dL Final     Comment:     For infants and newborns, interpretation of results should be based  on gestational age, weight and in agreement with clinical  observations.    Premature Infant recommended reference ranges:  Up to 24 hours.............<8.0 mg/dL  Up to 48 hours............<12.0 mg/dL  3-5 days..................<15.0 mg/dL  6-29 days.................<15.0 mg/dL       Alkaline Phosphatase   Date Value Ref Range Status   04/24/2021 63 55 - 135 U/L Final     AST   Date Value Ref Range Status   04/24/2021 18 10 - 40 U/L Final     ALT   Date Value Ref Range Status   04/24/2021 22 10 - 44 U/L Final     Anion Gap   Date Value Ref Range Status   04/24/2021 6 (L) 8 - 16 mmol/L Final     eGFR if    Date Value Ref Range Status   04/24/2021 >60.0 >60 mL/min/1.73 m^2 Final     eGFR if non    Date Value Ref Range Status   04/24/2021 >60.0 >60 mL/min/1.73 m^2 Final     Comment:     Calculation used to obtain the estimated glomerular filtration  rate (eGFR) is the CKD-EPI equation.                 Reviewed prior medical records including endoscopy history (see surgical history).     Objective:      Physical Exam  Constitutional:       General: He is not in acute distress.Vital signs are normal.      Appearance: He is well-developed and well-nourished. He is not sickly-appearing.   HENT:      Head: Normocephalic.      Right Ear: Hearing normal.      Left Ear: Hearing normal.      Nose: Nose normal.      Mouth/Throat:      Mouth: Oropharynx is clear and moist and mucous membranes are normal.      Comments: Pt wearing mask due to COVID concerns  Eyes:      General: Lids are normal.      Conjunctiva/sclera: Conjunctivae normal.      Pupils: Pupils are equal, round, and reactive  to light.   Neck:      Trachea: Trachea normal.   Cardiovascular:      Rate and Rhythm: Normal rate and regular rhythm.      Heart sounds: Normal heart sounds. No murmur heard.      Pulmonary:      Effort: Pulmonary effort is normal. No respiratory distress.      Breath sounds: Normal breath sounds. No stridor. No wheezing.   Abdominal:      General: Bowel sounds are normal. There is no distension or ascites.      Palpations: Abdomen is soft. There is no mass.      Tenderness: There is no abdominal tenderness. There is no guarding or rebound.   Musculoskeletal:         General: Normal range of motion.      Cervical back: Normal range of motion.   Skin:     General: Skin is warm, dry and intact.      Findings: No rash.      Comments: Non jaundiced   Neurological:      Mental Status: He is alert and oriented to person, place, and time.   Psychiatric:         Mood and Affect: Mood and affect normal.         Speech: Speech normal.         Behavior: Behavior normal. Behavior is cooperative.           Assessment:       1. RUQ abdominal pain    2. Screening for colon cancer           Plan:   All diagnoses and orders for this visit:    RUQ abdominal pain  - Lipase; Future; Expected date: 01/19/2022  - Hepatic Function Panel; Future; Expected date: 01/19/2022  - US Abdomen Complete; Future; Expected date: 01/19/2022  - Schedule EGD        - Start: omeprazole (PRILOSEC) 40 MG capsule; Take 1 capsule (40 mg total) by mouth once daily.  Dispense: 30 capsule; Refill: 2        - Take PPI in the morning 30 minutes before breakfast        - Recommend to avoid large meals, avoid eating within 3 hours of bedtime, elevate head of bed if nocturnal symptoms are present, smoking cessation (if current smoker), & weight loss (if overweight).         - Recommend minimize/avoid high-fat foods, chocolate, caffeine, citrus, alcohol, & tomato products.        - Advised to avoid/limit use of NSAID's, since they can cause GI upset, bleeding,  and/or ulcers. If needed, take with food.          - Recommend follow-up with PCP for possible musculoskeletal etiology    Screening for colon cancer   - Schedule Colonoscopy    If no improvement in symptoms or symptoms worsen, call/follow-up at clinic or go to ER

## 2022-01-20 ENCOUNTER — TELEPHONE (OUTPATIENT)
Dept: GASTROENTEROLOGY | Facility: CLINIC | Age: 67
End: 2022-01-20
Payer: COMMERCIAL

## 2022-01-20 ENCOUNTER — PATIENT MESSAGE (OUTPATIENT)
Dept: GASTROENTEROLOGY | Facility: CLINIC | Age: 67
End: 2022-01-20
Payer: COMMERCIAL

## 2022-01-20 DIAGNOSIS — M25.511 RIGHT SHOULDER PAIN, UNSPECIFIED CHRONICITY: Primary | ICD-10-CM

## 2022-01-20 NOTE — TELEPHONE ENCOUNTER
----- Message from Judy Noriega NP sent at 1/20/2022  2:48 PM CST -----  Let patient know his pancreatic enzyme level and liver function are normal.

## 2022-01-20 NOTE — TELEPHONE ENCOUNTER
----- Message from Lizz Sheth, Patient Care Assistant sent at 1/20/2022 11:08 AM CST -----  Regarding: advice  Contact: pt  Type: Needs Medical Advice  Who Called:  pt   Best Call Back Number: [phone] (home)     Additional Information: pt states he would like a callback regarding his appointments on 1/31/22 and 3/7/22. Thanks!

## 2022-01-26 ENCOUNTER — HOSPITAL ENCOUNTER (OUTPATIENT)
Dept: RADIOLOGY | Facility: HOSPITAL | Age: 67
Discharge: HOME | End: 2022-01-26
Attending: ORTHOPAEDIC SURGERY
Payer: COMMERCIAL

## 2022-01-26 ENCOUNTER — OFFICE VISIT (OUTPATIENT)
Dept: ORTHOPEDICS | Facility: CLINIC | Age: 67
End: 2022-01-26
Payer: COMMERCIAL

## 2022-01-26 VITALS — WEIGHT: 230 LBS | BODY MASS INDEX: 32.2 KG/M2 | RESPIRATION RATE: 18 BRPM | HEIGHT: 71 IN

## 2022-01-26 DIAGNOSIS — M25.511 RIGHT SHOULDER PAIN, UNSPECIFIED CHRONICITY: Primary | ICD-10-CM

## 2022-01-26 DIAGNOSIS — M75.100 ROTATOR CUFF SYNDROME, UNSPECIFIED LATERALITY: Primary | ICD-10-CM

## 2022-01-26 DIAGNOSIS — M25.511 RIGHT SHOULDER PAIN, UNSPECIFIED CHRONICITY: ICD-10-CM

## 2022-01-26 PROCEDURE — 1125F PR PAIN SEVERITY QUANTIFIED, PAIN PRESENT: ICD-10-PCS | Mod: CPTII,S$GLB,, | Performed by: ORTHOPAEDIC SURGERY

## 2022-01-26 PROCEDURE — 99203 OFFICE O/P NEW LOW 30 MIN: CPT | Mod: S$GLB,,, | Performed by: ORTHOPAEDIC SURGERY

## 2022-01-26 PROCEDURE — 99999 PR PBB SHADOW E&M-EST. PATIENT-LVL III: ICD-10-PCS | Mod: PBBFAC,,, | Performed by: ORTHOPAEDIC SURGERY

## 2022-01-26 PROCEDURE — 99999 PR PBB SHADOW E&M-EST. PATIENT-LVL III: CPT | Mod: PBBFAC,,, | Performed by: ORTHOPAEDIC SURGERY

## 2022-01-26 PROCEDURE — 73030 X-RAY EXAM OF SHOULDER: CPT | Mod: 26,RT,, | Performed by: RADIOLOGY

## 2022-01-26 PROCEDURE — 1159F MED LIST DOCD IN RCRD: CPT | Mod: CPTII,S$GLB,, | Performed by: ORTHOPAEDIC SURGERY

## 2022-01-26 PROCEDURE — 1101F PT FALLS ASSESS-DOCD LE1/YR: CPT | Mod: CPTII,S$GLB,, | Performed by: ORTHOPAEDIC SURGERY

## 2022-01-26 PROCEDURE — 3288F PR FALLS RISK ASSESSMENT DOCUMENTED: ICD-10-PCS | Mod: CPTII,S$GLB,, | Performed by: ORTHOPAEDIC SURGERY

## 2022-01-26 PROCEDURE — 99203 PR OFFICE/OUTPT VISIT, NEW, LEVL III, 30-44 MIN: ICD-10-PCS | Mod: S$GLB,,, | Performed by: ORTHOPAEDIC SURGERY

## 2022-01-26 PROCEDURE — 1159F PR MEDICATION LIST DOCUMENTED IN MEDICAL RECORD: ICD-10-PCS | Mod: CPTII,S$GLB,, | Performed by: ORTHOPAEDIC SURGERY

## 2022-01-26 PROCEDURE — 1125F AMNT PAIN NOTED PAIN PRSNT: CPT | Mod: CPTII,S$GLB,, | Performed by: ORTHOPAEDIC SURGERY

## 2022-01-26 PROCEDURE — 1160F RVW MEDS BY RX/DR IN RCRD: CPT | Mod: CPTII,S$GLB,, | Performed by: ORTHOPAEDIC SURGERY

## 2022-01-26 PROCEDURE — 3008F BODY MASS INDEX DOCD: CPT | Mod: CPTII,S$GLB,, | Performed by: ORTHOPAEDIC SURGERY

## 2022-01-26 PROCEDURE — 1101F PR PT FALLS ASSESS DOC 0-1 FALLS W/OUT INJ PAST YR: ICD-10-PCS | Mod: CPTII,S$GLB,, | Performed by: ORTHOPAEDIC SURGERY

## 2022-01-26 PROCEDURE — 73030 XR SHOULDER TRAUMA 3 VIEW RIGHT: ICD-10-PCS | Mod: 26,RT,, | Performed by: RADIOLOGY

## 2022-01-26 PROCEDURE — 73030 X-RAY EXAM OF SHOULDER: CPT | Mod: TC,PO,RT

## 2022-01-26 PROCEDURE — 3008F PR BODY MASS INDEX (BMI) DOCUMENTED: ICD-10-PCS | Mod: CPTII,S$GLB,, | Performed by: ORTHOPAEDIC SURGERY

## 2022-01-26 PROCEDURE — 3288F FALL RISK ASSESSMENT DOCD: CPT | Mod: CPTII,S$GLB,, | Performed by: ORTHOPAEDIC SURGERY

## 2022-01-26 PROCEDURE — 1160F PR REVIEW ALL MEDS BY PRESCRIBER/CLIN PHARMACIST DOCUMENTED: ICD-10-PCS | Mod: CPTII,S$GLB,, | Performed by: ORTHOPAEDIC SURGERY

## 2022-01-26 RX ORDER — KETOROLAC TROMETHAMINE 10 MG/1
10 TABLET, FILM COATED ORAL EVERY 6 HOURS
Qty: 20 TABLET | Refills: 0 | Status: SHIPPED | OUTPATIENT
Start: 2022-01-26 | End: 2022-01-31

## 2022-01-26 RX ORDER — MELOXICAM 15 MG/1
15 TABLET ORAL DAILY
Qty: 30 TABLET | Refills: 2 | Status: SHIPPED | OUTPATIENT
Start: 2022-01-26 | End: 2022-04-25

## 2022-01-26 NOTE — PROGRESS NOTES
Past Medical History:   Diagnosis Date    Arthritis     Neck pain     Type 2 diabetes mellitus        Past Surgical History:   Procedure Laterality Date    COLONOSCOPY   ?    unremarkable per pt report    EPIDURAL BLOCK INJECTION      cervical    INJECTION OF ANESTHETIC AGENT AROUND MEDIAL BRANCH NERVES INNERVATING CERVICAL FACET JOINT Right 2018    Procedure: MEDIAL BRANCH, CERVICAL BLOCK C3-C4, C4-C5, C5-C6;  Surgeon: Irineo Shepherd MD;  Location: [facility];  Service: Pain Management;  Laterality: Right;    JOINT REPLACEMENT Left 2018    hip       Current Outpatient Medications   Medication Sig    blood sugar diagnostic Strp Insurance preferred lancets and test strips. Checkc AC/HS    blood sugar diagnostic Strp To check BG 4 times daily (AC and HS), to use with insurance preferred meter    hydrocortisone 2.5 % cream Apply topically 2 (two) times daily.    omeprazole (PRILOSEC) 40 MG capsule Take 1 capsule (40 mg total) by mouth once daily.    blood-glucose meter kit To check BG 4 times daily (AC and HS), to use with insurance preferred meter     No current facility-administered medications for this visit.     Facility-Administered Medications Ordered in Other Visits   Medication    lactated ringers infusion       Review of patient's allergies indicates:   Allergen Reactions    Thorazine [chlorpromazine] Anxiety       Family History   Problem Relation Age of Onset    Heart disease Father     Hyperlipidemia Father     Diabetes Father     Colon cancer Neg Hx     Esophageal cancer Neg Hx     Stomach cancer Neg Hx        Social History     Socioeconomic History    Marital status:    Tobacco Use    Smoking status: Former Smoker     Quit date: 1974     Years since quittin.1    Smokeless tobacco: Never Used   Substance and Sexual Activity    Alcohol use: No    Drug use: No       Chief Complaint:   Chief Complaint   Patient presents with    Shoulder Pain     eval right  shoulder        History of present illness:  This is a 66-year-old right-hand-dominant male seen in consultation for Dr. Montilla.  Patient has had shoulder pain for months now.  No specific injury.  Pain particularly with reaching into the backseat of his pickup truck or sleeping on his side at night.  Patient was seen at urgent care.  He was given a Medrol Dosepak but it did not really help.  Pain is a 2/10 but up to 6/10 at night.  Mild pain with overhead activity as well.      Review of Systems:    Constitution: Negative for chills, fever, and sweats.  Negative for unexplained weight loss.    HENT:  Negative for headaches and blurry vision.    Cardiovascular:Negative for chest pain or irregular heart beat. Negative for hypertension.    Respiratory:  Negative for cough and shortness of breath.    Gastrointestinal: Negative for abdominal pain, heartburn, melena, nausea, and vomitting.    Genitourinary:  Negative bladder incontinence and dysuria.    Musculoskeletal:  See HPI    Neurological: Negative for numbness.    Psychiatric/Behavioral: Negative for depression.  The patient is not nervous/anxious.      Endocrine: Negative for polyuria    Hematologic/Lymphatic: Negative for bleeding problem.  Does not bruise/bleed easily.    Skin: Negative for poor would healing and rash      Physical Examination:    Vital Signs:    Vitals:    01/26/22 1453   Resp: 18       Body mass index is 32.08 kg/m².    This a well-developed, well nourished patient in no acute distress.  They are alert and oriented and cooperative to examination.  Pt. walks without an antalgic gait.      Examination of the right shoulder shows no rashes or erythema. There are no masses, ecchymosis, or atrophy. The patient has full range of motion in forward flexion, external rotation, and internal rotation to the mid T-spine. The patient has positive Santos test.  Positive Neer - [place]'s test. - Speeds test. Nontender to palpation over a.c. joint. Normal  stability anteriorly, posteriorly, and negative sulcus sign. Passive range of motion: Forward flexion of 180°, external rotation at 90° of 90°, internal rotation of 50°, and external rotation at 0° of 50°. 2+ radial pulse. Intact axillary, radial, median and ulnar sensation.  4+ out of 5 resisted forward flexion, external rotation, and negative lift off test.    Examination of the left shoulder shows no rashes or erythema. There are no masses, ecchymosis, or atrophy. The patient has full range of motion in forward flexion, external rotation, and internal rotation to the mid T-spine. The patient has - Santos test. - Neer - [place]'s test. - Speeds test. Nontender to palpation over a.c. joint. Normal stability anteriorly, posteriorly, and negative sulcus sign. Passive range of motion: Forward flexion of 180°, external rotation at 90° of 90°, internal rotation of 50°, and external rotation at 0° of 50°. 2+ radial pulse. Intact axillary, radial, median and ulnar sensation. 5 out of 5 resisted forward flexion, external rotation, and negative lift off test.        X-rays:  X-rays of the right shoulder ordered and reviewed which show some AC hypertrophy.  Otherwise normal appearing shoulder     Assessment::  Right rotator cuff syndrome    Plan:  I reviewed the findings with him today.  I recommended an anti-inflammatory as well as a home rotator cuff program.  I gave him a band and exercise guide.  I will start him off on some Toradol and then transition him to Mobic.  Follow-up in 6 weeks.  Next step would be an MRI to look for tear    This note was created using [employer] voice recognition software that occasionally misinterpreted phrases or words.    Consult note is delivered via Epic messaging service.

## 2022-01-27 ENCOUNTER — TELEPHONE (OUTPATIENT)
Dept: GASTROENTEROLOGY | Facility: CLINIC | Age: 67
End: 2022-01-27
Payer: COMMERCIAL

## 2022-01-27 ENCOUNTER — PATIENT MESSAGE (OUTPATIENT)
Dept: GASTROENTEROLOGY | Facility: CLINIC | Age: 67
End: 2022-01-27
Payer: COMMERCIAL

## 2022-01-27 ENCOUNTER — HOSPITAL ENCOUNTER (OUTPATIENT)
Dept: RADIOLOGY | Facility: HOSPITAL | Age: 67
Discharge: HOME | End: 2022-01-27
Attending: NURSE PRACTITIONER
Payer: COMMERCIAL

## 2022-01-27 DIAGNOSIS — R10.11 RUQ ABDOMINAL PAIN: ICD-10-CM

## 2022-01-27 DIAGNOSIS — K80.20 CALCULUS OF GALLBLADDER WITHOUT CHOLECYSTITIS WITHOUT OBSTRUCTION: Primary | ICD-10-CM

## 2022-01-27 PROCEDURE — 76705 ECHO EXAM OF ABDOMEN: CPT | Mod: TC,PO

## 2022-01-27 PROCEDURE — 76705 US ABDOMEN LIMITED: ICD-10-PCS | Mod: 26,,, | Performed by: RADIOLOGY

## 2022-01-27 PROCEDURE — 76705 ECHO EXAM OF ABDOMEN: CPT | Mod: 26,,, | Performed by: RADIOLOGY

## 2022-01-27 NOTE — TELEPHONE ENCOUNTER
Let patient know his US of abdomen showed multiple gallstones, right kidney cysts, and cortical parenchymal thinning of right kidney suggesting renal disease. Recommend a referral to general surgery for opinion on gallbladder and follow-up with PCP/urology for renal findings.

## 2022-01-27 NOTE — TELEPHONE ENCOUNTER
----- Message from Dagmar Jaramillo sent at 1/27/2022  1:56 PM CST -----  Contact: Patient  Type:  Patient Returning Call    Who Called: Patient     Who Left Message for Patient: Carlo Godwin,    Does the patient know what this is regarding?:     Would the patient rather a call back or a response via Application [facility]?  Call    Best Call Back Number: [phone] (home)     Additional Information:

## 2022-01-27 NOTE — TELEPHONE ENCOUNTER
Pt was calling regarding US results. Notified him of results per Judy Noriega NP. Pt verbalized understanding.

## 2022-01-28 ENCOUNTER — LAB VISIT (OUTPATIENT)
Dept: FAMILY MEDICINE | Facility: CLINIC | Age: 67
End: 2022-01-28
Payer: COMMERCIAL

## 2022-01-28 DIAGNOSIS — Z01.818 PRE-OP TESTING: ICD-10-CM

## 2022-01-28 PROCEDURE — U0005 INFEC AGEN DETEC AMPLI PROBE: HCPCS | Performed by: INTERNAL MEDICINE

## 2022-01-28 PROCEDURE — U0003 INFECTIOUS AGENT DETECTION BY NUCLEIC ACID (DNA OR RNA); SEVERE ACUTE RESPIRATORY SYNDROME CORONAVIRUS 2 (SARS-COV-2) (CORONAVIRUS DISEASE [COVID-19]), AMPLIFIED PROBE TECHNIQUE, MAKING USE OF HIGH THROUGHPUT TECHNOLOGIES AS DESCRIBED BY CMS-2020-01-R: HCPCS | Performed by: INTERNAL MEDICINE

## 2022-01-29 LAB
SARS-COV-2 RNA RESP QL NAA+PROBE: NOT DETECTED
SARS-COV-2- CYCLE NUMBER: NORMAL

## 2022-02-01 ENCOUNTER — OFFICE VISIT (OUTPATIENT)
Dept: SURGERY | Facility: CLINIC | Age: 67
End: 2022-02-01
Payer: COMMERCIAL

## 2022-02-01 VITALS — HEIGHT: 71 IN | BODY MASS INDEX: 32.9 KG/M2

## 2022-02-01 DIAGNOSIS — R10.11 RIGHT UPPER QUADRANT ABDOMINAL PAIN: Primary | ICD-10-CM

## 2022-02-01 PROCEDURE — 1159F PR MEDICATION LIST DOCUMENTED IN MEDICAL RECORD: ICD-10-PCS | Mod: CPTII,95,, | Performed by: SURGERY

## 2022-02-01 PROCEDURE — 3008F BODY MASS INDEX DOCD: CPT | Mod: CPTII,95,, | Performed by: SURGERY

## 2022-02-01 PROCEDURE — 1159F MED LIST DOCD IN RCRD: CPT | Mod: CPTII,95,, | Performed by: SURGERY

## 2022-02-01 PROCEDURE — 99203 OFFICE O/P NEW LOW 30 MIN: CPT | Mod: 95,,, | Performed by: SURGERY

## 2022-02-01 PROCEDURE — 3008F PR BODY MASS INDEX (BMI) DOCUMENTED: ICD-10-PCS | Mod: CPTII,95,, | Performed by: SURGERY

## 2022-02-01 PROCEDURE — 99203 PR OFFICE/OUTPT VISIT, NEW, LEVL III, 30-44 MIN: ICD-10-PCS | Mod: 95,,, | Performed by: SURGERY

## 2022-02-01 NOTE — PROGRESS NOTES
The patient location is: home  The chief complaint leading to consultation is: RUQ pain    Visit type: audiovisual    Face to Face time with patient: 15 mins  30 minutes of total time spent on the encounter, which includes face to face time and non-face to face time preparing to see the patient (eg, review of tests), Obtaining and/or reviewing separately obtained history, Documenting clinical information in the electronic or other health record, Independently interpreting results (not separately reported) and communicating results to the patient/family/caregiver, or Care coordination (not separately reported).         Each patient to whom he or she provides medical services by telemedicine is:  (1) informed of the relationship between the physician and patient and the respective role of any other health care provider with respect to management of the patient; and (2) notified that he or she may decline to receive medical services by telemedicine and may withdraw from such care at any time.    Notes:     Pt notes pain in the RUQ beneath his rib cage. NOtes pain mostly present with going from sitting to standing.  Denies n/v.  No fever/chills.  EGD was WNL.  Pt had US demonstrating cholelithaisis.      Lab Results   Component Value Date    WBC 6.26 07/02/2019    HGB 15.2 07/02/2019    HCT 45.1 07/02/2019    MCV 88 07/02/2019     07/02/2019       BMP  Lab Results   Component Value Date     04/24/2021    K 4.2 04/24/2021     04/24/2021    CO2 27 04/24/2021    BUN 28 (H) 04/24/2021    CREATININE 0.9 04/24/2021    CALCIUM 8.9 04/24/2021    ANIONGAP 6 (L) 04/24/2021    ESTGFRAFRICA >60.0 04/24/2021    EGFRNONAA >60.0 04/24/2021     Pt with no significant PShx.    US:  Biliary system: There are multiple mobile shadowing gallstones present within the gallbladder lumen.  No sonographic Farley sign, gallbladder distension, gallbladder wall hyperemia, pericholecystic fluid, or gallbladder wall thickening.   The common duct is not dilated, measuring 3 mm. No intrahepatic ductal dilatation.    A?P cholelithaisis    D/w pt.  Uncertain if this is related to his pain.  Discussed this with pt.  His symptoms do not seem typical for biliary colic. Will order HIDA scan to assess for biliary function

## 2022-02-02 ENCOUNTER — TELEPHONE (OUTPATIENT)
Dept: SURGERY | Facility: CLINIC | Age: 67
End: 2022-02-02
Payer: COMMERCIAL

## 2022-02-02 NOTE — TELEPHONE ENCOUNTER
I called patient to inform him that Dr. Mart wants him to have a HIDA scan done.  Patient can go early in the morning. I'll call him back to advise him of the time and date.  Josesito

## 2022-02-02 NOTE — TELEPHONE ENCOUNTER
I called patient to inform him of the HIDA scan scheduled at [facility] on Friday, 2/11/22 @ 8am.  Patient was instructed to arrive at the registration desk @ 7:30am and to fast 4hrs prior with no narcotics 6hrs prior.  Josesito

## 2022-03-04 ENCOUNTER — LAB VISIT (OUTPATIENT)
Dept: FAMILY MEDICINE | Facility: CLINIC | Age: 67
End: 2022-03-04
Payer: COMMERCIAL

## 2022-03-04 DIAGNOSIS — Z01.818 PRE-OP TESTING: ICD-10-CM

## 2022-03-04 PROCEDURE — U0005 INFEC AGEN DETEC AMPLI PROBE: HCPCS | Performed by: INTERNAL MEDICINE

## 2022-03-04 PROCEDURE — U0003 INFECTIOUS AGENT DETECTION BY NUCLEIC ACID (DNA OR RNA); SEVERE ACUTE RESPIRATORY SYNDROME CORONAVIRUS 2 (SARS-COV-2) (CORONAVIRUS DISEASE [COVID-19]), AMPLIFIED PROBE TECHNIQUE, MAKING USE OF HIGH THROUGHPUT TECHNOLOGIES AS DESCRIBED BY CMS-2020-01-R: HCPCS | Performed by: INTERNAL MEDICINE

## 2022-03-05 LAB
SARS-COV-2 RNA RESP QL NAA+PROBE: NOT DETECTED
SARS-COV-2- CYCLE NUMBER: NORMAL

## 2022-03-07 ENCOUNTER — TELEPHONE (OUTPATIENT)
Dept: SURGERY | Facility: CLINIC | Age: 67
End: 2022-03-07
Payer: COMMERCIAL

## 2022-03-07 NOTE — TELEPHONE ENCOUNTER
Called pt and reviewed findings of HIDA.   EF 35%    Marginally low.    Clinically doing better recommend observation which pt desires.   F/u with me prn

## 2022-03-09 ENCOUNTER — PATIENT MESSAGE (OUTPATIENT)
Dept: GASTROENTEROLOGY | Facility: CLINIC | Age: 67
End: 2022-03-09
Payer: COMMERCIAL

## 2022-03-09 ENCOUNTER — OFFICE VISIT (OUTPATIENT)
Dept: ORTHOPEDICS | Facility: CLINIC | Age: 67
End: 2022-03-09
Payer: COMMERCIAL

## 2022-03-09 ENCOUNTER — PATIENT MESSAGE (OUTPATIENT)
Dept: ORTHOPEDICS | Facility: CLINIC | Age: 67
End: 2022-03-09

## 2022-03-09 VITALS — HEIGHT: 71 IN | RESPIRATION RATE: 18 BRPM | BODY MASS INDEX: 32.9 KG/M2 | WEIGHT: 235 LBS

## 2022-03-09 DIAGNOSIS — M25.511 RIGHT SHOULDER PAIN, UNSPECIFIED CHRONICITY: Primary | ICD-10-CM

## 2022-03-09 DIAGNOSIS — M75.101 NONTRAUMATIC TEAR OF RIGHT ROTATOR CUFF, UNSPECIFIED TEAR EXTENT: ICD-10-CM

## 2022-03-09 DIAGNOSIS — M75.100 ROTATOR CUFF SYNDROME, UNSPECIFIED LATERALITY: Primary | ICD-10-CM

## 2022-03-09 PROCEDURE — 1126F AMNT PAIN NOTED NONE PRSNT: CPT | Mod: CPTII,S$GLB,, | Performed by: ORTHOPAEDIC SURGERY

## 2022-03-09 PROCEDURE — 3061F PR NEG MICROALBUMINURIA RESULT DOCUMENTED/REVIEW: ICD-10-PCS | Mod: CPTII,S$GLB,, | Performed by: ORTHOPAEDIC SURGERY

## 2022-03-09 PROCEDURE — 99999 PR PBB SHADOW E&M-EST. PATIENT-LVL III: ICD-10-PCS | Mod: PBBFAC,,, | Performed by: ORTHOPAEDIC SURGERY

## 2022-03-09 PROCEDURE — 3008F PR BODY MASS INDEX (BMI) DOCUMENTED: ICD-10-PCS | Mod: CPTII,S$GLB,, | Performed by: ORTHOPAEDIC SURGERY

## 2022-03-09 PROCEDURE — 1159F MED LIST DOCD IN RCRD: CPT | Mod: CPTII,S$GLB,, | Performed by: ORTHOPAEDIC SURGERY

## 2022-03-09 PROCEDURE — 3066F PR DOCUMENTATION OF TREATMENT FOR NEPHROPATHY: ICD-10-PCS | Mod: CPTII,S$GLB,, | Performed by: ORTHOPAEDIC SURGERY

## 2022-03-09 PROCEDURE — 3066F NEPHROPATHY DOC TX: CPT | Mod: CPTII,S$GLB,, | Performed by: ORTHOPAEDIC SURGERY

## 2022-03-09 PROCEDURE — 99213 OFFICE O/P EST LOW 20 MIN: CPT | Mod: S$GLB,,, | Performed by: ORTHOPAEDIC SURGERY

## 2022-03-09 PROCEDURE — 1159F PR MEDICATION LIST DOCUMENTED IN MEDICAL RECORD: ICD-10-PCS | Mod: CPTII,S$GLB,, | Performed by: ORTHOPAEDIC SURGERY

## 2022-03-09 PROCEDURE — 3061F NEG MICROALBUMINURIA REV: CPT | Mod: CPTII,S$GLB,, | Performed by: ORTHOPAEDIC SURGERY

## 2022-03-09 PROCEDURE — 99213 PR OFFICE/OUTPT VISIT, EST, LEVL III, 20-29 MIN: ICD-10-PCS | Mod: S$GLB,,, | Performed by: ORTHOPAEDIC SURGERY

## 2022-03-09 PROCEDURE — 99999 PR PBB SHADOW E&M-EST. PATIENT-LVL III: CPT | Mod: PBBFAC,,, | Performed by: ORTHOPAEDIC SURGERY

## 2022-03-09 PROCEDURE — 1126F PR PAIN SEVERITY QUANTIFIED, NO PAIN PRESENT: ICD-10-PCS | Mod: CPTII,S$GLB,, | Performed by: ORTHOPAEDIC SURGERY

## 2022-03-09 PROCEDURE — 3008F BODY MASS INDEX DOCD: CPT | Mod: CPTII,S$GLB,, | Performed by: ORTHOPAEDIC SURGERY

## 2022-03-09 NOTE — PROGRESS NOTES
Past Medical History:   Diagnosis Date    Arthritis     Neck pain     Type 2 diabetes mellitus        Past Surgical History:   Procedure Laterality Date    COLONOSCOPY  2002 ?    unremarkable per pt report    COLONOSCOPY N/A 3/7/2022    Procedure: COLONOSCOPY;  Surgeon: Elías Albert MD;  Location: [facility];  Service: Endoscopy;  Laterality: N/A;    EPIDURAL BLOCK INJECTION      cervical    ESOPHAGOGASTRODUODENOSCOPY N/A 1/31/2022    Procedure: EGD (ESOPHAGOGASTRODUODENOSCOPY);  Surgeon: Elías Albert MD;  Location: [facility];  Service: Endoscopy;  Laterality: N/A;    INJECTION OF ANESTHETIC AGENT AROUND MEDIAL BRANCH NERVES INNERVATING CERVICAL FACET JOINT Right 8/31/2018    Procedure: MEDIAL BRANCH, CERVICAL BLOCK C3-C4, C4-C5, C5-C6;  Surgeon: Irineo Shepherd MD;  Location: [facility];  Service: Pain Management;  Laterality: Right;    JOINT REPLACEMENT Left 2018    hip       Current Outpatient Medications   Medication Sig    blood sugar diagnostic Strp Insurance preferred lancets and test strips. Checkc AC/HS    blood sugar diagnostic Strp To check BG 4 times daily (AC and HS), to use with insurance preferred meter    hydrocortisone 2.5 % cream Apply topically 2 (two) times daily.    meloxicam (MOBIC) 15 MG tablet Take 1 tablet (15 mg total) by mouth once daily. Start after 5 days of toradol is finished    omeprazole (PRILOSEC) 40 MG capsule Take 1 capsule (40 mg total) by mouth once daily.    blood-glucose meter kit To check BG 4 times daily (AC and HS), to use with insurance preferred meter     No current facility-administered medications for this visit.     Facility-Administered Medications Ordered in Other Visits   Medication    lactated ringers infusion    lactated ringers infusion    sodium chloride 0.9% flush 10 mL       Review of patient's allergies indicates:   Allergen Reactions    Thorazine [chlorpromazine] Anxiety       Family History   Problem Relation Age of Onset     Heart disease Father     Hyperlipidemia Father     Diabetes Father     Colon cancer Neg Hx     Esophageal cancer Neg Hx     Stomach cancer Neg Hx        Social History     Socioeconomic History    Marital status:    Tobacco Use    Smoking status: Former Smoker     Quit date: 1974     Years since quittin.2    Smokeless tobacco: Never Used   Substance and Sexual Activity    Alcohol use: No    Drug use: No       Chief Complaint:   Chief Complaint   Patient presents with    Shoulder Pain     follow up right shoulder        History of present illness:  This is a 66-year-old right-hand-dominant male seen in consultation for Dr. Montilla.  Patient has had shoulder pain for months now.  No specific injury.  Pain particularly with reaching into the backseat of his pickup truck or sleeping on his side at night.  Patient was seen at urgent care.  He was given a Medrol Dosepak but it did not really help.  Pain is a 2/10 but up to 6/10 at night.  Mild pain with overhead activity as well.  He tried home exercises and medications without any improvement.  Symptoms are the same.  A lot of difficulty particularly reaching behind his back or any kind of internal rotation.      Review of Systems:    Constitution: Negative for chills, fever, and sweats.  Negative for unexplained weight loss.    HENT:  Negative for headaches and blurry vision.    Cardiovascular:Negative for chest pain or irregular heart beat. Negative for hypertension.    Respiratory:  Negative for cough and shortness of breath.    Gastrointestinal: Negative for abdominal pain, heartburn, melena, nausea, and vomitting.    Genitourinary:  Negative bladder incontinence and dysuria.    Musculoskeletal:  See HPI    Neurological: Negative for numbness.    Psychiatric/Behavioral: Negative for depression.  The patient is not nervous/anxious.      Endocrine: Negative for polyuria    Hematologic/Lymphatic: Negative for bleeding problem.  Does not  bruise/bleed easily.    Skin: Negative for poor would healing and rash      Physical Examination:    Vital Signs:    Vitals:    03/09/22 1422   Resp: 18       Body mass index is 32.78 kg/m².    This a well-developed, well nourished patient in no acute distress.  They are alert and oriented and cooperative to examination.  Pt. walks without an antalgic gait.      Examination of the right shoulder shows no rashes or erythema. There are no masses, ecchymosis, or atrophy. The patient has full range of motion in forward flexion, external rotation, and internal rotation to the mid T-spine. The patient has positive Santos test.  Positive Neer - [place]'s test. - Speeds test. Nontender to palpation over a.c. joint. Normal stability anteriorly, posteriorly, and negative sulcus sign. Passive range of motion: Forward flexion of 180°, external rotation at 90° of 90°, internal rotation of 50°, and external rotation at 0° of 50°. 2+ radial pulse. Intact axillary, radial, median and ulnar sensation.  4+ out of 5 resisted forward flexion, external rotation, and negative lift off test.      X-rays:  X-rays of the right shoulder  reviewed which show some AC hypertrophy.  Otherwise normal appearing shoulder     Assessment::  Right rotator cuff syndrome    Plan:  I reviewed the findings with him today.  I recommended getting an MRI to look for possible tear.  Patient has had symptoms for a prolonged period of time despite exercises, medications and activity modification.  Follow-up after the MRI is completed    This note was created using [employer] voice recognition software that occasionally misinterpreted phrases or words.    Consult note is delivered via Epic messaging service.

## 2022-03-10 RX ORDER — ALPRAZOLAM 0.25 MG/1
0.25 TABLET ORAL
Qty: 2 TABLET | Refills: 0 | Status: SHIPPED | OUTPATIENT
Start: 2022-03-10 | End: 2022-03-11

## 2022-03-14 ENCOUNTER — PATIENT MESSAGE (OUTPATIENT)
Dept: ORTHOPEDICS | Facility: CLINIC | Age: 67
End: 2022-03-14
Payer: COMMERCIAL

## 2022-03-14 ENCOUNTER — TELEPHONE (OUTPATIENT)
Dept: ORTHOPEDICS | Facility: CLINIC | Age: 67
End: 2022-03-14
Payer: COMMERCIAL

## 2022-03-14 NOTE — TELEPHONE ENCOUNTER
Called number they stated the MRI was authorized. Informed them I will call patient and see what he wanted to do

## 2022-03-14 NOTE — TELEPHONE ENCOUNTER
----- Message from Areli Jack MA sent at 3/14/2022 11:55 AM CDT -----  Contact: [facility]  Calling on imaging   Call back    Option 2 then 3  Reference number [id number]

## 2022-03-14 NOTE — TELEPHONE ENCOUNTER
Called and spoke with patient he would like to go to an outside facility because it will be cheaper. I asked pt what the name of the place he stated he doesn't know it off the top of his head but when he gets home he will send me a message and let me know. I said that was fine and as soon as he sends me the name of the place I will go ahead and fax the order to them so they can get him scheduled. Pt verbalized understanding.

## 2022-03-15 ENCOUNTER — PATIENT MESSAGE (OUTPATIENT)
Dept: ORTHOPEDICS | Facility: CLINIC | Age: 67
End: 2022-03-15
Payer: COMMERCIAL

## 2022-03-23 ENCOUNTER — PATIENT MESSAGE (OUTPATIENT)
Dept: ORTHOPEDICS | Facility: CLINIC | Age: 67
End: 2022-03-23

## 2022-03-23 ENCOUNTER — OFFICE VISIT (OUTPATIENT)
Dept: ORTHOPEDICS | Facility: CLINIC | Age: 67
End: 2022-03-23
Attending: ORTHOPAEDIC SURGERY
Payer: COMMERCIAL

## 2022-03-23 VITALS — BODY MASS INDEX: 32.9 KG/M2 | WEIGHT: 235 LBS | HEIGHT: 71 IN | RESPIRATION RATE: 18 BRPM

## 2022-03-23 DIAGNOSIS — M25.511 RIGHT SHOULDER PAIN, UNSPECIFIED CHRONICITY: Primary | ICD-10-CM

## 2022-03-23 DIAGNOSIS — M75.101 NONTRAUMATIC TEAR OF RIGHT ROTATOR CUFF, UNSPECIFIED TEAR EXTENT: ICD-10-CM

## 2022-03-23 PROCEDURE — 1101F PR PT FALLS ASSESS DOC 0-1 FALLS W/OUT INJ PAST YR: ICD-10-PCS | Mod: CPTII,S$GLB,, | Performed by: ORTHOPAEDIC SURGERY

## 2022-03-23 PROCEDURE — 3288F FALL RISK ASSESSMENT DOCD: CPT | Mod: CPTII,S$GLB,, | Performed by: ORTHOPAEDIC SURGERY

## 2022-03-23 PROCEDURE — 3061F NEG MICROALBUMINURIA REV: CPT | Mod: CPTII,S$GLB,, | Performed by: ORTHOPAEDIC SURGERY

## 2022-03-23 PROCEDURE — 20610 DRAIN/INJ JOINT/BURSA W/O US: CPT | Mod: RT,S$GLB,, | Performed by: ORTHOPAEDIC SURGERY

## 2022-03-23 PROCEDURE — 1159F MED LIST DOCD IN RCRD: CPT | Mod: CPTII,S$GLB,, | Performed by: ORTHOPAEDIC SURGERY

## 2022-03-23 PROCEDURE — 3008F PR BODY MASS INDEX (BMI) DOCUMENTED: ICD-10-PCS | Mod: CPTII,S$GLB,, | Performed by: ORTHOPAEDIC SURGERY

## 2022-03-23 PROCEDURE — 1160F PR REVIEW ALL MEDS BY PRESCRIBER/CLIN PHARMACIST DOCUMENTED: ICD-10-PCS | Mod: CPTII,S$GLB,, | Performed by: ORTHOPAEDIC SURGERY

## 2022-03-23 PROCEDURE — 1125F AMNT PAIN NOTED PAIN PRSNT: CPT | Mod: CPTII,S$GLB,, | Performed by: ORTHOPAEDIC SURGERY

## 2022-03-23 PROCEDURE — 99999 PR PBB SHADOW E&M-EST. PATIENT-LVL III: ICD-10-PCS | Mod: PBBFAC,,, | Performed by: ORTHOPAEDIC SURGERY

## 2022-03-23 PROCEDURE — 1159F PR MEDICATION LIST DOCUMENTED IN MEDICAL RECORD: ICD-10-PCS | Mod: CPTII,S$GLB,, | Performed by: ORTHOPAEDIC SURGERY

## 2022-03-23 PROCEDURE — 1160F RVW MEDS BY RX/DR IN RCRD: CPT | Mod: CPTII,S$GLB,, | Performed by: ORTHOPAEDIC SURGERY

## 2022-03-23 PROCEDURE — 3066F PR DOCUMENTATION OF TREATMENT FOR NEPHROPATHY: ICD-10-PCS | Mod: CPTII,S$GLB,, | Performed by: ORTHOPAEDIC SURGERY

## 2022-03-23 PROCEDURE — 1125F PR PAIN SEVERITY QUANTIFIED, PAIN PRESENT: ICD-10-PCS | Mod: CPTII,S$GLB,, | Performed by: ORTHOPAEDIC SURGERY

## 2022-03-23 PROCEDURE — 1101F PT FALLS ASSESS-DOCD LE1/YR: CPT | Mod: CPTII,S$GLB,, | Performed by: ORTHOPAEDIC SURGERY

## 2022-03-23 PROCEDURE — 3288F PR FALLS RISK ASSESSMENT DOCUMENTED: ICD-10-PCS | Mod: CPTII,S$GLB,, | Performed by: ORTHOPAEDIC SURGERY

## 2022-03-23 PROCEDURE — 99999 PR PBB SHADOW E&M-EST. PATIENT-LVL III: CPT | Mod: PBBFAC,,, | Performed by: ORTHOPAEDIC SURGERY

## 2022-03-23 PROCEDURE — 99213 PR OFFICE/OUTPT VISIT, EST, LEVL III, 20-29 MIN: ICD-10-PCS | Mod: 25,S$GLB,, | Performed by: ORTHOPAEDIC SURGERY

## 2022-03-23 PROCEDURE — 3008F BODY MASS INDEX DOCD: CPT | Mod: CPTII,S$GLB,, | Performed by: ORTHOPAEDIC SURGERY

## 2022-03-23 PROCEDURE — 3066F NEPHROPATHY DOC TX: CPT | Mod: CPTII,S$GLB,, | Performed by: ORTHOPAEDIC SURGERY

## 2022-03-23 PROCEDURE — 20610 LARGE JOINT ASPIRATION/INJECTION: R SUBACROMIAL BURSA: ICD-10-PCS | Mod: RT,S$GLB,, | Performed by: ORTHOPAEDIC SURGERY

## 2022-03-23 PROCEDURE — 3061F PR NEG MICROALBUMINURIA RESULT DOCUMENTED/REVIEW: ICD-10-PCS | Mod: CPTII,S$GLB,, | Performed by: ORTHOPAEDIC SURGERY

## 2022-03-23 PROCEDURE — 99213 OFFICE O/P EST LOW 20 MIN: CPT | Mod: 25,S$GLB,, | Performed by: ORTHOPAEDIC SURGERY

## 2022-03-23 RX ORDER — TRIAMCINOLONE ACETONIDE 40 MG/ML
40 INJECTION, SUSPENSION INTRA-ARTICULAR; INTRAMUSCULAR
Status: DISCONTINUED | OUTPATIENT
Start: 2022-03-23 | End: 2022-03-23 | Stop reason: HOSPADM

## 2022-03-23 RX ADMIN — TRIAMCINOLONE ACETONIDE 40 MG: 40 INJECTION, SUSPENSION INTRA-ARTICULAR; INTRAMUSCULAR at 02:03

## 2022-03-23 NOTE — PROGRESS NOTES
Past Medical History:   Diagnosis Date    Arthritis     Neck pain     Type 2 diabetes mellitus        Past Surgical History:   Procedure Laterality Date    COLONOSCOPY  2002 ?    unremarkable per pt report    COLONOSCOPY N/A 3/7/2022    Procedure: COLONOSCOPY;  Surgeon: Elías Albert MD;  Location: [facility];  Service: Endoscopy;  Laterality: N/A;    EPIDURAL BLOCK INJECTION      cervical    ESOPHAGOGASTRODUODENOSCOPY N/A 1/31/2022    Procedure: EGD (ESOPHAGOGASTRODUODENOSCOPY);  Surgeon: Elías Albert MD;  Location: [facility];  Service: Endoscopy;  Laterality: N/A;    INJECTION OF ANESTHETIC AGENT AROUND MEDIAL BRANCH NERVES INNERVATING CERVICAL FACET JOINT Right 8/31/2018    Procedure: MEDIAL BRANCH, CERVICAL BLOCK C3-C4, C4-C5, C5-C6;  Surgeon: Irineo Shepherd MD;  Location: [facility];  Service: Pain Management;  Laterality: Right;    JOINT REPLACEMENT Left 2018    hip       Current Outpatient Medications   Medication Sig    ALPRAZolam (XANAX) 0.25 MG tablet Take 1 tablet (0.25 mg total) by mouth as needed for Anxiety (pre medication).    blood sugar diagnostic Strp Insurance preferred lancets and test strips. Checkc AC/HS    blood sugar diagnostic Strp To check BG 4 times daily (AC and HS), to use with insurance preferred meter    blood-glucose meter kit To check BG 4 times daily (AC and HS), to use with insurance preferred meter    hydrocortisone 2.5 % cream Apply topically 2 (two) times daily.    meloxicam (MOBIC) 15 MG tablet Take 1 tablet (15 mg total) by mouth once daily. Start after 5 days of toradol is finished    omeprazole (PRILOSEC) 40 MG capsule Take 1 capsule (40 mg total) by mouth once daily.     No current facility-administered medications for this visit.     Facility-Administered Medications Ordered in Other Visits   Medication    lactated ringers infusion    lactated ringers infusion    sodium chloride 0.9% flush 10 mL       Review of patient's allergies  indicates:   Allergen Reactions    Thorazine [chlorpromazine] Anxiety       Family History   Problem Relation Age of Onset    Heart disease Father     Hyperlipidemia Father     Diabetes Father     Colon cancer Neg Hx     Esophageal cancer Neg Hx     Stomach cancer Neg Hx        Social History     Socioeconomic History    Marital status:    Tobacco Use    Smoking status: Former Smoker     Quit date: 1974     Years since quittin.2    Smokeless tobacco: Never Used   Substance and Sexual Activity    Alcohol use: No    Drug use: No       Chief Complaint:   No chief complaint on file.      History of present illness:  This is a 66-year-old right-hand-dominant male seen in consultation for Dr. Montilla.  Patient has had shoulder pain for months now.  No specific injury.  Pain particularly with reaching into the backseat of his pickup truck or sleeping on his side at night.  Patient was seen at urgent care.  He was given a Medrol Dosepak but it did not really help.  Pain is a 2/10 but up to 6/10 at night.  Mild pain with overhead activity as well.  He tried home exercises and medications without any improvement.  Symptoms are the same.  A lot of difficulty particularly reaching behind his back or any kind of internal rotation.  We got an MRI which did not show any significant rotator cuff tearing but did show more moderate glenohumeral arthritis.      Review of Systems:    Constitution: Negative for chills, fever, and sweats.  Negative for unexplained weight loss.    HENT:  Negative for headaches and blurry vision.    Cardiovascular:Negative for chest pain or irregular heart beat. Negative for hypertension.    Respiratory:  Negative for cough and shortness of breath.    Gastrointestinal: Negative for abdominal pain, heartburn, melena, nausea, and vomitting.    Genitourinary:  Negative bladder incontinence and dysuria.    Musculoskeletal:  See HPI    Neurological: Negative for  numbness.    Psychiatric/Behavioral: Negative for depression.  The patient is not nervous/anxious.      Endocrine: Negative for polyuria    Hematologic/Lymphatic: Negative for bleeding problem.  Does not bruise/bleed easily.    Skin: Negative for poor would healing and rash      Physical Examination:    Vital Signs:    There were no vitals filed for this visit.    There is no height or weight on file to calculate BMI.    This a well-developed, well nourished patient in no acute distress.  They are alert and oriented and cooperative to examination.  Pt. walks without an antalgic gait.      Examination of the right shoulder shows no rashes or erythema. There are no masses, ecchymosis, or atrophy. The patient has full range of motion in forward flexion, external rotation, and internal rotation to the mid T-spine. The patient has positive Santos test.  Positive Neer - [place]'s test. - Speeds test. Nontender to palpation over a.c. joint. Normal stability anteriorly, posteriorly, and negative sulcus sign. Passive range of motion: Forward flexion of 180°, external rotation at 90° of 90°, internal rotation of 50°, and external rotation at 0° of 50°. 2+ radial pulse. Intact axillary, radial, median and ulnar sensation.  4+ out of 5 resisted forward flexion, external rotation, and negative lift off test.      X-rays:  X-rays of the right shoulder  reviewed which show some AC hypertrophy.  Otherwise normal appearing shoulder    MRI of the right shoulder from [facility] is reviewed and interpreted today dated March 22, 2022:  AC osteoarthritis with findings of subacromial impingement with bursitis.  Supraspinatus tendinosis with partial-thickness partial with moderate grade articular surface tearing.  Infraspinatus tendinosis with partial thickness partial width low-grade articular surface tearing.  Subscapularis tendinosis with partial low-grade articular surface tearing.  Biceps tendinosis with longitudinal split tenosynovitis.   Glenohumeral osteoarthritis with effusion.  Pan glenoid labral tearing.     Assessment::  Right shoulder arthritis  Right biceps tendinitis with tearing    Plan:  I reviewed the findings with him today.  I talked about treatment options.  Patient has never really had any formal treatment.  We agreed to try an injection to start.  Follow-up as needed.    This note was created using [employer] voice recognition software that occasionally misinterpreted phrases or words.    Consult note is delivered via Epic messaging service.

## 2022-03-23 NOTE — PROCEDURES
Large Joint Aspiration/Injection: R subacromial bursa    Date/Time: 3/23/2022 2:45 PM  Performed by: Tay Noonan MD  Authorized by: Tay Noonan MD     Consent Done?:  Yes (Verbal)  Indications:  Pain  Site marked: the procedure site was marked    Timeout: prior to procedure the correct patient, procedure, and site was verified    Local anesthetic:  Lidocaine 1% without epinephrine and bupivacaine 0.25% without epinephrine  Anesthetic total (ml):  6      Details:  Needle Size:  20 G  Ultrasonic Guidance for needle placement?: No    Approach:  Posterior  Location:  Shoulder  Site:  R subacromial bursa  Medications:  40 mg triamcinolone acetonide 40 mg/mL  Patient tolerance:  Patient tolerated the procedure well with no immediate complications

## 2023-06-05 ENCOUNTER — TELEPHONE (OUTPATIENT)
Dept: CARDIOLOGY | Facility: CLINIC | Age: 68
End: 2023-06-05
Payer: COMMERCIAL

## 2023-06-05 DIAGNOSIS — R00.2 PALPITATIONS: Primary | ICD-10-CM

## 2023-06-06 ENCOUNTER — OFFICE VISIT (OUTPATIENT)
Dept: CARDIOLOGY | Facility: CLINIC | Age: 68
End: 2023-06-06
Payer: COMMERCIAL

## 2023-06-06 VITALS
BODY MASS INDEX: 33.95 KG/M2 | OXYGEN SATURATION: 100 % | SYSTOLIC BLOOD PRESSURE: 126 MMHG | DIASTOLIC BLOOD PRESSURE: 73 MMHG | HEIGHT: 71 IN | WEIGHT: 242.5 LBS | HEART RATE: 70 BPM

## 2023-06-06 DIAGNOSIS — E66.09 CLASS 2 OBESITY DUE TO EXCESS CALORIES WITHOUT SERIOUS COMORBIDITY WITH BODY MASS INDEX (BMI) OF 35.0 TO 35.9 IN ADULT: Primary | ICD-10-CM

## 2023-06-06 PROCEDURE — 1101F PR PT FALLS ASSESS DOC 0-1 FALLS W/OUT INJ PAST YR: ICD-10-PCS | Mod: CPTII,S$GLB,, | Performed by: INTERNAL MEDICINE

## 2023-06-06 PROCEDURE — 99213 OFFICE O/P EST LOW 20 MIN: CPT | Mod: S$GLB,,, | Performed by: INTERNAL MEDICINE

## 2023-06-06 PROCEDURE — 3288F PR FALLS RISK ASSESSMENT DOCUMENTED: ICD-10-PCS | Mod: CPTII,S$GLB,, | Performed by: INTERNAL MEDICINE

## 2023-06-06 PROCEDURE — 99999 PR PBB SHADOW E&M-EST. PATIENT-LVL IV: CPT | Mod: PBBFAC,,, | Performed by: INTERNAL MEDICINE

## 2023-06-06 PROCEDURE — 3288F FALL RISK ASSESSMENT DOCD: CPT | Mod: CPTII,S$GLB,, | Performed by: INTERNAL MEDICINE

## 2023-06-06 PROCEDURE — 3078F PR MOST RECENT DIASTOLIC BLOOD PRESSURE < 80 MM HG: ICD-10-PCS | Mod: CPTII,S$GLB,, | Performed by: INTERNAL MEDICINE

## 2023-06-06 PROCEDURE — 99999 PR PBB SHADOW E&M-EST. PATIENT-LVL IV: ICD-10-PCS | Mod: PBBFAC,,, | Performed by: INTERNAL MEDICINE

## 2023-06-06 PROCEDURE — 1126F PR PAIN SEVERITY QUANTIFIED, NO PAIN PRESENT: ICD-10-PCS | Mod: CPTII,S$GLB,, | Performed by: INTERNAL MEDICINE

## 2023-06-06 PROCEDURE — 1101F PT FALLS ASSESS-DOCD LE1/YR: CPT | Mod: CPTII,S$GLB,, | Performed by: INTERNAL MEDICINE

## 2023-06-06 PROCEDURE — 1126F AMNT PAIN NOTED NONE PRSNT: CPT | Mod: CPTII,S$GLB,, | Performed by: INTERNAL MEDICINE

## 2023-06-06 PROCEDURE — 3008F BODY MASS INDEX DOCD: CPT | Mod: CPTII,S$GLB,, | Performed by: INTERNAL MEDICINE

## 2023-06-06 PROCEDURE — 3074F SYST BP LT 130 MM HG: CPT | Mod: CPTII,S$GLB,, | Performed by: INTERNAL MEDICINE

## 2023-06-06 PROCEDURE — 1159F MED LIST DOCD IN RCRD: CPT | Mod: CPTII,S$GLB,, | Performed by: INTERNAL MEDICINE

## 2023-06-06 PROCEDURE — 3074F PR MOST RECENT SYSTOLIC BLOOD PRESSURE < 130 MM HG: ICD-10-PCS | Mod: CPTII,S$GLB,, | Performed by: INTERNAL MEDICINE

## 2023-06-06 PROCEDURE — 1159F PR MEDICATION LIST DOCUMENTED IN MEDICAL RECORD: ICD-10-PCS | Mod: CPTII,S$GLB,, | Performed by: INTERNAL MEDICINE

## 2023-06-06 PROCEDURE — 99213 PR OFFICE/OUTPT VISIT, EST, LEVL III, 20-29 MIN: ICD-10-PCS | Mod: S$GLB,,, | Performed by: INTERNAL MEDICINE

## 2023-06-06 PROCEDURE — 3008F PR BODY MASS INDEX (BMI) DOCUMENTED: ICD-10-PCS | Mod: CPTII,S$GLB,, | Performed by: INTERNAL MEDICINE

## 2023-06-06 PROCEDURE — 3078F DIAST BP <80 MM HG: CPT | Mod: CPTII,S$GLB,, | Performed by: INTERNAL MEDICINE

## 2023-06-06 NOTE — PROGRESS NOTES
Subjective:    Patient ID:  Randolph Villeda is a 67 y.o. male who presents for evaluation of     HPI  The patient is a 67 year old male past seen in 2018 for pre-op evalution for right THR. He present for pre-op cataract surgery. He continues to do well and has lost 30# since his last visit. He continues to do well and reports no chest pain or SALDANA. He is in construction and remains active  t        Lab Results   Component Value Date    CHOL 213 (H) 04/24/2021    HDL 48 04/24/2021    TRIG 137 04/24/2021       Lab Results   Component Value Date    LDLCALC 137.6 04/24/2021       Past Medical History:   Diagnosis Date    Arthritis     Neck pain     Type 2 diabetes mellitus        Current Outpatient Medications:     blood sugar diagnostic Strp, Insurance preferred lancets and test strips. Checkc AC/HS, Disp: 150 each, Rfl: 12    blood sugar diagnostic Strp, To check BG 4 times daily (AC and HS), to use with insurance preferred meter, Disp: 400 strip, Rfl: 3    HYDROcodone-acetaminophen (NORCO) 5-325 mg per tablet, Take 1 tablet by mouth every 4 to 6 hours as needed for Pain., Disp: 10 tablet, Rfl: 0    hydrocortisone 2.5 % cream, Apply topically 2 (two) times daily., Disp: 28 g, Rfl: 2    meloxicam (MOBIC) 15 MG tablet, TAKE 1 TABLET (15 MG TOTAL) BY MOUTH ONCE DAILY. START AFTER 5 DAYS OF TORADOL IS FINISHED, Disp: 30 tablet, Rfl: 2    omeprazole (PRILOSEC) 40 MG capsule, TAKE 1 CAPSULE BY MOUTH EVERY DAY, Disp: 90 capsule, Rfl: 1    ALPRAZolam (XANAX) 0.25 MG tablet, Take 1 tablet (0.25 mg total) by mouth as needed for Anxiety (pre medication)., Disp: 2 tablet, Rfl: 0    blood-glucose meter kit, To check BG 4 times daily (AC and HS), to use with insurance preferred meter, Disp: 1 each, Rfl: 0  No current facility-administered medications for this visit.    Facility-Administered Medications Ordered in Other Visits:     lactated ringers infusion, , Intravenous, Continuous, Rubin Matamoros MD, Stopped at 08/31/18  "1723    lactated ringers infusion, , Intravenous, Continuous, Elías Albert MD, Last Rate: 75 mL/hr at 03/07/22 0935, New Bag at 03/07/22 0935    sodium chloride 0.9% flush 10 mL, 10 mL, Intravenous, PRN, Elías Albert MD          Review of Systems   Constitutional: Negative for decreased appetite, diaphoresis, fever, malaise/fatigue, weight gain and weight loss.   HENT:  Negative for congestion, ear discharge, ear pain and nosebleeds.    Eyes:  Negative for blurred vision, double vision and visual disturbance.   Cardiovascular:  Negative for chest pain, claudication, cyanosis, dyspnea on exertion, irregular heartbeat, leg swelling, near-syncope, orthopnea, palpitations, paroxysmal nocturnal dyspnea and syncope.   Respiratory:  Negative for cough, hemoptysis, shortness of breath, sleep disturbances due to breathing, snoring, sputum production and wheezing.    Endocrine: Negative for polydipsia, polyphagia and polyuria.   Hematologic/Lymphatic: Negative for adenopathy and bleeding problem. Does not bruise/bleed easily.   Skin:  Negative for color change, nail changes, poor wound healing and rash.   Musculoskeletal:  Negative for muscle cramps and muscle weakness.   Gastrointestinal:  Negative for abdominal pain, anorexia, change in bowel habit, hematochezia, nausea and vomiting.   Genitourinary:  Negative for dysuria, frequency and hematuria.   Neurological:  Negative for brief paralysis, difficulty with concentration, excessive daytime sleepiness, dizziness, focal weakness, headaches, light-headedness, seizures, vertigo and weakness.   Psychiatric/Behavioral:  Negative for altered mental status and depression.    Allergic/Immunologic: Negative for persistent infections.      Objective:/73 (BP Location: Left arm, Patient Position: Sitting, BP Method: Large (Automatic))   Pulse 70   Ht 5' 11" (1.803 m)   Wt 110 kg (242 lb 8.1 oz)   SpO2 100%   BMI 33.82 kg/m²             Physical " Exam  Constitutional:       Appearance: He is well-developed. He is obese.   HENT:      Head: Normocephalic.      Right Ear: External ear normal.      Left Ear: External ear normal.      Nose: Nose normal.   Eyes:      General: No scleral icterus.     Pupils: Pupils are equal, round, and reactive to light.   Neck:      Thyroid: No thyromegaly.      Vascular: No JVD.      Trachea: No tracheal deviation.   Cardiovascular:      Rate and Rhythm: Normal rate and regular rhythm.      Pulses: Intact distal pulses.           Carotid pulses are 2+ on the right side and 2+ on the left side.       Dorsalis pedis pulses are 2+ on the right side and 2+ on the left side.        Posterior tibial pulses are 2+ on the right side and 2+ on the left side.      Heart sounds: No murmur heard.    No friction rub. No gallop.   Pulmonary:      Effort: Pulmonary effort is normal.      Breath sounds: Normal breath sounds.   Abdominal:      General: Bowel sounds are normal. There is no distension.      Tenderness: There is no abdominal tenderness. There is no guarding.   Musculoskeletal:         General: No tenderness. Normal range of motion.      Cervical back: Normal range of motion and neck supple.   Lymphadenopathy:      Comments: Palpation of neck and groin lymph nodes normal   Skin:     General: Skin is dry.      Comments: Palpation of skin normal   Neurological:      Mental Status: He is alert and oriented to person, place, and time.      Cranial Nerves: No cranial nerve deficit.      Motor: No abnormal muscle tone.      Coordination: Coordination normal.   Psychiatric:         Behavior: Behavior normal.         Thought Content: Thought content normal.         Judgment: Judgment normal.         Assessment:       Pre-diabetes  Obesity      Plan:       Lows CV risk for planned cataract surgery

## 2024-05-11 ENCOUNTER — OFFICE VISIT (OUTPATIENT)
Dept: URGENT CARE | Facility: CLINIC | Age: 69
End: 2024-05-11
Payer: MEDICARE

## 2024-05-11 VITALS
OXYGEN SATURATION: 95 % | RESPIRATION RATE: 18 BRPM | TEMPERATURE: 98 F | HEIGHT: 71 IN | SYSTOLIC BLOOD PRESSURE: 128 MMHG | DIASTOLIC BLOOD PRESSURE: 74 MMHG | HEART RATE: 88 BPM | BODY MASS INDEX: 33.88 KG/M2 | WEIGHT: 242 LBS

## 2024-05-11 DIAGNOSIS — M94.0 COSTOCHONDRITIS: Primary | ICD-10-CM

## 2024-05-11 PROCEDURE — 96372 THER/PROPH/DIAG INJ SC/IM: CPT | Mod: S$GLB,,, | Performed by: PHYSICIAN ASSISTANT

## 2024-05-11 PROCEDURE — 99213 OFFICE O/P EST LOW 20 MIN: CPT | Mod: 25,S$GLB,, | Performed by: PHYSICIAN ASSISTANT

## 2024-05-11 RX ORDER — KETOROLAC TROMETHAMINE 30 MG/ML
30 INJECTION, SOLUTION INTRAMUSCULAR; INTRAVENOUS
Status: COMPLETED | OUTPATIENT
Start: 2024-05-11 | End: 2024-05-11

## 2024-05-11 RX ORDER — PREDNISONE 20 MG/1
20 TABLET ORAL DAILY
Qty: 5 TABLET | Refills: 0 | Status: SHIPPED | OUTPATIENT
Start: 2024-05-11 | End: 2024-05-16

## 2024-05-11 RX ORDER — TIZANIDINE 2 MG/1
4 TABLET ORAL EVERY 8 HOURS PRN
Qty: 15 TABLET | Refills: 0 | Status: SHIPPED | OUTPATIENT
Start: 2024-05-11 | End: 2024-05-16

## 2024-05-11 RX ADMIN — KETOROLAC TROMETHAMINE 30 MG: 30 INJECTION, SOLUTION INTRAMUSCULAR; INTRAVENOUS at 02:05

## 2024-05-11 NOTE — PROGRESS NOTES
"Subjective:      Patient ID: Randolph Villeda is a 68 y.o. male.    Vitals:  height is 5' 11" (1.803 m) and weight is 109.8 kg (242 lb). His oral temperature is 97.8 °F (36.6 °C). His blood pressure is 128/74 and his pulse is 88. His respiration is 18 and oxygen saturation is 95%.     Chief Complaint: Rib Injury    Pt presents with pain on left side (rib area). Pt states has had pain for 3 days. Pain score 4/10    Chest Pain   This is a new problem. The current episode started in the past 7 days. The onset quality is undetermined. The problem occurs constantly. The problem has been unchanged. The pain is at a severity of 4/10. The pain is mild. The quality of the pain is described as heavy. The pain does not radiate. Pertinent negatives include no abdominal pain, back pain, claudication, cough, diaphoresis, dizziness, exertional chest pressure, fever, headaches, hemoptysis, irregular heartbeat, leg pain, lower extremity edema, malaise/fatigue, nausea, near-syncope, numbness, orthopnea, palpitations, PND, shortness of breath, sputum production, syncope, vomiting or weakness. The pain is aggravated by nothing. He has tried nothing for the symptoms. There are no known risk factors.   Pertinent negatives for past medical history include no seizures.       Constitution: Negative for chills, sweating, fatigue and fever.   HENT:  Negative for ear pain, drooling, congestion, sore throat, trouble swallowing and voice change.    Neck: Negative for neck pain, neck stiffness and painful lymph nodes.   Cardiovascular:  Positive for chest pain. Negative for leg swelling, palpitations, sob on exertion and passing out.   Eyes:  Negative for eye discharge, eye itching, eye pain, eye redness and eyelid swelling.   Respiratory:  Negative for chest tightness, cough, sputum production, bloody sputum, shortness of breath, stridor and wheezing.    Gastrointestinal:  Negative for abdominal pain, abdominal bloating, nausea, vomiting, " constipation, diarrhea and heartburn.   Genitourinary:  Negative for urine decreased.   Musculoskeletal:  Positive for pain and muscle ache. Negative for joint pain, joint swelling, abnormal ROM of joint, back pain, pain with walking and muscle cramps.   Skin:  Negative for rash and hives.   Allergic/Immunologic: Negative for hives, itching and sneezing.   Neurological:  Negative for dizziness, light-headedness, passing out, loss of balance, headaches, altered mental status, loss of consciousness, numbness and seizures.   Hematologic/Lymphatic: Negative for swollen lymph nodes.   Psychiatric/Behavioral:  Negative for altered mental status and nervous/anxious. The patient is not nervous/anxious.       Objective:     Physical Exam   Constitutional: He is oriented to person, place, and time. He appears well-developed. He is cooperative.   HENT:   Head: Normocephalic and atraumatic.   Ears:   Right Ear: Hearing, tympanic membrane, external ear and ear canal normal.   Left Ear: Hearing, tympanic membrane, external ear and ear canal normal.   Nose: Nose normal. No mucosal edema or nasal deformity. No epistaxis. Right sinus exhibits no maxillary sinus tenderness and no frontal sinus tenderness. Left sinus exhibits no maxillary sinus tenderness and no frontal sinus tenderness.   Mouth/Throat: Uvula is midline, oropharynx is clear and moist and mucous membranes are normal. No trismus in the jaw. Normal dentition. No uvula swelling.   Eyes: Conjunctivae and lids are normal.   Neck: Trachea normal and phonation normal. Neck supple.   Cardiovascular: Normal rate, regular rhythm, normal heart sounds and normal pulses.   Pulmonary/Chest: Effort normal and breath sounds normal.   Abdominal: Normal appearance and bowel sounds are normal. Soft.   Musculoskeletal: Normal range of motion.         General: Tenderness present. No signs of injury. Normal range of motion.      Comments: +ttp to left 4th intercostal space. No rashes or  bruising. No swelling or erythema. Normal breath sounds   Neurological: He is alert and oriented to person, place, and time. He exhibits normal muscle tone.   Skin: Skin is warm, dry and intact.   Psychiatric: His speech is normal and behavior is normal. Judgment and thought content normal.   Nursing note and vitals reviewed.      Assessment:     1. Costochondritis        Plan:   No pain with deep breathes, pericarditis and pleurisy unlikely. Continue to monitor for rash to rule out HZ. If sob, cp, pain out of proportion, go to ed immediately.  Vitals are stable and less concern for PE or pneumothorax. No trauma or heavy coughing, less likely rib fracture. RTC Monday for x-ray    Costochondritis  -     X-Ray Ribs 2 View Left; Future; Expected date: 05/11/2024    Other orders  -     tiZANidine (ZANAFLEX) 2 MG tablet; Take 2 tablets (4 mg total) by mouth every 8 (eight) hours as needed (spasm).  Dispense: 15 tablet; Refill: 0  -     predniSONE (DELTASONE) 20 MG tablet; Take 1 tablet (20 mg total) by mouth once daily. for 5 days  Dispense: 5 tablet; Refill: 0  -     ketorolac injection 30 mg      Patient Instructions   INSTRUCTIONS:  - Rest.  - Drink plenty of fluids.  - Take Tylenol and/or Ibuprofen as directed as needed for fever/pain.  Do not take more than the recommended dose.  - follow up with your PCP within the next 1-2 weeks as needed.  - You must understand that you have received an Urgent Care treatment only and that you may be released before all of your medical problems are known or treated.   - You, the patient, will arrange for follow up care as instructed.   - If your condition worsens or fails to improve we recommend that you receive another evaluation at the ER immediately or contact your PCP to discuss your concerns.   - You can call [phone] or [phone] to help schedule an appointment with the appropriate provider.     -If you smoke cigarettes, it would be beneficial for you to stop.

## 2024-05-11 NOTE — PATIENT INSTRUCTIONS

## 2024-05-13 ENCOUNTER — E-VISIT (OUTPATIENT)
Dept: FAMILY MEDICINE | Facility: CLINIC | Age: 69
End: 2024-05-13
Payer: MEDICARE

## 2024-05-13 DIAGNOSIS — M16.10 HIP ARTHRITIS: ICD-10-CM

## 2024-05-13 DIAGNOSIS — R07.81 RIB PAIN: ICD-10-CM

## 2024-05-13 DIAGNOSIS — M94.0 COSTOCHONDRITIS: ICD-10-CM

## 2024-05-13 DIAGNOSIS — R19.7 DIARRHEA, UNSPECIFIED TYPE: Primary | ICD-10-CM

## 2024-05-13 PROCEDURE — 99499 UNLISTED E&M SERVICE: CPT | Mod: ,,, | Performed by: STUDENT IN AN ORGANIZED HEALTH CARE EDUCATION/TRAINING PROGRAM

## 2024-05-13 RX ORDER — DICLOFENAC SODIUM 10 MG/G
2 GEL TOPICAL 4 TIMES DAILY
Qty: 100 G | Refills: 2 | Status: SHIPPED | OUTPATIENT
Start: 2024-05-13

## 2024-05-13 RX ORDER — AZITHROMYCIN 500 MG/1
500 TABLET, FILM COATED ORAL DAILY
Qty: 3 TABLET | Refills: 0 | Status: SHIPPED | OUTPATIENT
Start: 2024-05-13 | End: 2024-05-16

## 2024-05-13 NOTE — PROGRESS NOTES
Patient ID: Randolph Villeda is a 68 y.o. male.    Chief Complaint: GI Problem (Entered automatically based on patient selection in Patient Portal.)          274}  The patient initiated a request through [facility] on 5/13/2024 for evaluation and management with a chief complaint of GI Problem (Entered automatically based on patient selection in Patient Portal.)      I called the patient get further history reports that he  has had diarrhea for 1 day.  No abdominal pain no fevers no recent travel no new foods or restaurants.  Patient reports no blood no nausea or vomiting.  He did recently start Zanaflex and steroids for a new dx of costochondritis.  He reports that rib is painful to palpation no rash no shortness a breath.  Was concerned about possibly the medication being causing some of his diarrhea.  He is reports no recent antibiotic use no rlq pain no ruq pain.     I evaluated the questionnaire submission on 05/13/2024 .    Total Time (in minutes): 22     Ohs Peq Evisit Diarrhea    5/13/2024  9:38 AM CDT - Filed by Patient   Do you agree to participate in an E-Visit? Yes   If you have any of the following symptoms, please present to your local ER or call 911:  I acknowledge   What is the main issue you would like addressed today? Continuing Diarrhea   Are you able to take your vital signs? No   Do you have diarrhea? Yes   How many stools have you passed in the last 24 hours? One to four   Is there blood in your stool, or is your stool dark red or black? None of the above   Does your stool contain pus or mucus? No   Have you taken a laxative or a medicine to help you move your bowels lately? No   Are you vomiting? No, I am not vomiting   Do you have belly pain? I have a little pain or no pain   Are you feeling dizzy or like you might pass out? No   When did your symptoms begin? 5/12/2024   Do you have a fever? No, I do not have a fever   Are you having trouble walking or lifting yourself due to weakness from this  illness?  No   Do any of the following apply to you? My urine is normal   Did your condition begin after a specific meal that may have caused the illness? Not clearly related to a meal.    Have you taken antibiotics recently? I have not been on any antibiotics   Have you been hospitalized in the past 2 months? No, I have not been hospitalized recently.   Do you work in a  center or healthcare environment? No   Does anyone you know have similar symptoms? No   Have you had a meal consisting of raw meat or fish in the week prior to your illness? No   Have you recently travelled to a place where you may have caught an illness? No   Have you tried any medication or other treatment for your symptoms? No   Provide any additional information you feel is important. I was prescribed two medications by SUHA Rebollar at [facility] in [place]   Please attach any relevant images or files           Active Problem List with Overview Notes    Diagnosis Date Noted    Osteoarthritis of cervical spine 08/31/2018    Hip arthritis 05/02/2018    Class 2 obesity due to excess calories without serious comorbidity in adult 05/02/2018      Recent Labs Obtained:  Lab Results   Component Value Date    WBC 12.04 09/23/2022    HGB 13.6 (L) 09/23/2022    HCT 41.0 09/23/2022    MCV 90 09/23/2022     09/23/2022     09/23/2022    K 4.4 09/23/2022     (H) 09/23/2022    CREATININE 0.82 09/23/2022    EGFRNORACEVR >60 09/23/2022    HGBA1C 5.6 04/24/2021      Review of patient's allergies indicates:   Allergen Reactions    Thorazine [chlorpromazine] Anxiety       Encounter Diagnoses   Name Primary?    Diarrhea, unspecified type Yes    Costochondritis     Rib pain     Hip arthritis         No orders of the defined types were placed in this encounter.     Medications Ordered This Encounter   Medications    azithromycin (ZITHROMAX) 500 MG tablet     Sig: Take 1 tablet (500 mg total) by mouth once daily. for 3 days      Dispense:  3 tablet     Refill:  0    diclofenac sodium (VOLTAREN) 1 % Gel     Sig: Apply 2 g topically 4 (four) times daily.     Dispense:  100 g     Refill:  2       It is unusual will for Zanaflex steroids to cause diarrhea recommend for him to stop and if his Brandie continue he can take the antibiotic.  He does not have any abdominal pain and will monitor.      Costochondritis-will try topical Voltaren gel can try lidocaine monitor meloxicam did not help with the pain    E-Visit Time Tracking:    Day 1 Time (in minutes): 22    Total Time (in minutes): 22      274}

## 2024-08-27 DIAGNOSIS — U07.1 COVID-19 VIRUS DETECTED: ICD-10-CM

## 2024-09-09 PROBLEM — D64.9 NORMOCYTIC ANEMIA: Status: ACTIVE | Noted: 2024-09-09

## 2024-09-09 PROBLEM — F51.05 INSOMNIA SECONDARY TO ANXIETY: Status: ACTIVE | Noted: 2024-09-09

## 2024-09-09 PROBLEM — I70.0 AORTIC ATHEROSCLEROSIS: Status: ACTIVE | Noted: 2024-09-09

## 2024-09-09 PROBLEM — F41.9 INSOMNIA SECONDARY TO ANXIETY: Status: ACTIVE | Noted: 2024-09-09

## 2024-09-09 PROBLEM — E04.1 THYROID NODULE: Status: ACTIVE | Noted: 2024-09-09

## 2024-09-09 PROBLEM — R73.03 PREDIABETES: Status: ACTIVE | Noted: 2024-09-09

## 2024-09-09 PROBLEM — U07.1 PNEUMONIA DUE TO COVID-19 VIRUS: Status: ACTIVE | Noted: 2024-09-09

## 2024-09-09 PROBLEM — F40.240 CLAUSTROPHOBIA: Status: ACTIVE | Noted: 2024-09-09

## 2024-09-09 PROBLEM — J12.82 PNEUMONIA DUE TO COVID-19 VIRUS: Status: ACTIVE | Noted: 2024-09-09

## 2024-09-09 PROBLEM — E88.810 METABOLIC SYNDROME: Status: ACTIVE | Noted: 2024-09-09

## 2024-09-17 PROBLEM — R09.82 POSTNASAL DRIP: Status: ACTIVE | Noted: 2024-09-17

## 2024-09-17 PROBLEM — E66.01 CLASS 2 SEVERE OBESITY DUE TO EXCESS CALORIES WITH SERIOUS COMORBIDITY IN ADULT: Status: ACTIVE | Noted: 2018-05-02

## 2024-09-17 PROBLEM — E11.69 TYPE 2 DIABETES MELLITUS WITH HYPERLIPIDEMIA: Status: ACTIVE | Noted: 2024-09-09

## 2024-09-17 PROBLEM — E78.5 DYSLIPIDEMIA: Status: ACTIVE | Noted: 2024-09-17

## 2024-09-17 PROBLEM — U07.1 PNEUMONIA DUE TO COVID-19 VIRUS: Status: RESOLVED | Noted: 2024-09-09 | Resolved: 2024-09-17

## 2024-09-17 PROBLEM — J12.82 PNEUMONIA DUE TO COVID-19 VIRUS: Status: RESOLVED | Noted: 2024-09-09 | Resolved: 2024-09-17

## 2024-09-17 PROBLEM — E78.5 TYPE 2 DIABETES MELLITUS WITH HYPERLIPIDEMIA: Status: ACTIVE | Noted: 2024-09-09

## 2024-10-15 ENCOUNTER — PATIENT MESSAGE (OUTPATIENT)
Dept: RESEARCH | Facility: HOSPITAL | Age: 69
End: 2024-10-15
Payer: MEDICARE

## 2024-11-05 ENCOUNTER — PATIENT MESSAGE (OUTPATIENT)
Dept: RESEARCH | Facility: HOSPITAL | Age: 69
End: 2024-11-05
Payer: MEDICARE

## 2024-11-06 ENCOUNTER — PATIENT MESSAGE (OUTPATIENT)
Dept: ENDOCRINOLOGY | Facility: CLINIC | Age: 69
End: 2024-11-06
Payer: MEDICARE

## 2024-11-22 ENCOUNTER — OFFICE VISIT (OUTPATIENT)
Facility: CLINIC | Age: 69
End: 2024-11-22
Payer: MEDICARE

## 2024-11-22 DIAGNOSIS — E11.69 TYPE 2 DIABETES MELLITUS WITH HYPERLIPIDEMIA: ICD-10-CM

## 2024-11-22 DIAGNOSIS — E78.5 TYPE 2 DIABETES MELLITUS WITH HYPERLIPIDEMIA: ICD-10-CM

## 2024-11-22 DIAGNOSIS — E04.1 THYROID NODULE: Primary | ICD-10-CM

## 2024-11-22 DIAGNOSIS — E78.5 DYSLIPIDEMIA: ICD-10-CM

## 2024-11-22 NOTE — PROGRESS NOTES
ENDOCRINOLOGY NEW PATIENT VISIT: 11/22/2024    The patient location is: Home  The chief complaint leading to consultation is: Thyroid nodule    Visit type: audiovisual    Face to Face time with patient: 14  45 minutes of total time spent on the encounter, which includes face to face time and non-face to face time preparing to see the patient (eg, review of tests), Obtaining and/or reviewing separately obtained history, Documenting clinical information in the electronic or other health record, Independently interpreting results (not separately reported) and communicating results to the patient/family/caregiver, or Care coordination (not separately reported).     Each patient to whom he or she provides medical services by telemedicine is:  (1) informed of the relationship between the physician and patient and the respective role of any other health care provider with respect to management of the patient; and (2) notified that he or she may decline to receive medical services by telemedicine and may withdraw from such care at any time.    Subjective:      Patient ID: Randolph Villeda is a 69 y.o. male.    Chief Complaint:  Consult and Thyroid Nodule    History of Present Illness  Randolph Villeda presents for evaluation of a thyroid nodule which was seen on CT imaging recently.  He has not seen his PCP since that scan and has not been set up for ultrasound imaging yet.  No prior history of thyroid dysfunction or thyroid nodules.  No specific symptoms at this time.  Had been seen prior by Dr. Mcfarland (last visit 2021) for diabetes but has since been following with his PCP for DM with current use of metformin alone.        Regarding Thyroid Nodule:    - Thyroid nodules originally found on CT scan    CTA Chest:  08/27/2024    Impression:  The left lobe of the thyroid gland is enlarged and demonstrates an approximately 2 cm heterogeneously enhancing nodule (series 4, image 66).    - Preexisting thyroid disease: no  - Most recent  thyroid ultrasound: Not completed yet    Lab Results   Component Value Date    TSH 0.577 09/09/2024       - Previous biopsy results:  N/A  - Left nodule:  N/A  - Right nodule:  N/A  - Isthmus nodules:  N/A    - Risk Factors:  No   Yes    [x]    []   Radiation to head/neck for any reason  [x]    []   Personal history of colon or breast cancer  [x]    []   Tobacco use  [x]    []   Family history of thyroid cancer    - Compressive Symptoms:  No   Yes    [x]    []   Anterior neck pressure  [x]    []   Dysphagia  [x]    []   Voice changes    - Symptoms:   No   Yes    [x]    []   Weight loss  [x]    []   Fatigue  [x]    []   Constipation  [x]    []   Diarrhea  [x]    []   Anxiety/Nervousness  [x]    []   Tremor  [x]    []   Heat/cold intolerance  [x]    []   Palpitations  []    [x]   Eye changes (dry eyes only)  [x]    []   Shortness of breath  [x]    []   Lithium or Amiodarone use  [x]    []   Chemotherapy  [x]    []   Prior neck radiation  [x]    []   Recent severe illness   [x]    []   Exogenous thyroid medication  [x]    []   Hair loss  [x]    []   Brittle nails  [x]    []   Mental fog  [x]    []   Memory impair  [x]    []   Neck swelling, pain, pressure    No family history of thyroid concerns or cancer    Regarding Diabetes:    Management by PCP with decent glucose control based on latest A1c    Currently on Metformin alone    Lab Results   Component Value Date    HGBA1C 7.1 (H) 09/09/2024       ROS:   As above    Objective:     There were no vitals taken for this visit.  BP Readings from Last 3 Encounters:   09/18/24 136/76   09/17/24 118/70   09/14/24 114/62     Wt Readings from Last 1 Encounters:   09/18/24 1252 108.2 kg (238 lb 9.7 oz)     There is no height or weight on file to calculate BMI.    Physical Exam  Constitutional:       Appearance: Normal appearance.   Neurological:      Mental Status: He is alert.   Psychiatric:         Mood and Affect: Mood normal.         Behavior: Behavior normal.        Lab  "Review:   Lab Results   Component Value Date    HGBA1C 7.1 (H) 09/09/2024     Lab Results   Component Value Date    CHOL 214 (H) 09/09/2024    HDL 39 (L) 09/09/2024    LDLCALC 131.6 09/09/2024    TRIG 217 (H) 09/09/2024    CHOLHDL 18.2 (L) 09/09/2024     Lab Results   Component Value Date     09/09/2024    K 4.6 09/09/2024     09/09/2024    CO2 27 09/09/2024     (H) 09/09/2024    BUN 21 09/09/2024    CREATININE 0.95 09/09/2024    CALCIUM 9.4 09/09/2024    PROT 7.9 09/23/2022    ALBUMIN 4.7 09/23/2022    BILITOT 0.6 09/23/2022    ALKPHOS 79 09/23/2022    AST 38 09/23/2022    ALT 30 09/23/2022    ANIONGAP 11 09/09/2024    ESTGFRAFRICA >60 02/28/2022    EGFRNONAA >60 02/28/2022    TSH 0.577 09/09/2024     No results found for: "[id number]"    Assessment and Plan     Dyslipidemia  Remains on Crestor 20 mg daily.  Goal LDL with diabetes history is less than 70.      Type 2 diabetes mellitus with hyperlipidemia  Remains under care of PCP.  Now on metformin with decent glucose control.  Latest A1c slightly elevated at 7.1%.     Thyroid nodule  Reviewed recent CT scan and history.  No specific symptoms of compression from the left side thyroid nodule.  No prior imaging to review.  Nodule appeared to be around 2 cm on imaging so further evaluation needed to see if this meets criteria for FNA and if there are other nodules.      Plan:  - Check thyroid ultrasound soon  - If nodule meets FNA criteria we will arrange biopsy at main campus [facility] office  - If nodule not concerning but meeting follow up criteria when repeat US at 1 year will be arranged.       RTC in 1 year.  Ultrasound soon      **Visit today included increased complexity associated with the care of the problems addressed and managing the longitudinal care of the patient due to the serious and/or complex managed problems      Haile Castillo DO     Disclaimer: This note has been generated using voice-recognition software. There " may be typographical errors that have been missed during proof-reading.

## 2024-11-22 NOTE — PATIENT INSTRUCTIONS
Thank you for visiting with me during our virtual appointment today.      As discussed we will work on arranging a thyroid ultrasound for you in the near future.  This will help us determine if the nodule is meeting any criteria based on appearance or size to perform a biopsy.  A majority of thyroid nodules are benign and not concerning.  If a biopsy is needed we will arrange this at the main [facility] office in [place].  If the nodule has no concerns and appears benign then we can follow over time with imaging as needed.    Continue management of diabetes and other conditions with your primary care physician.  I am including some general information on thyroid nodules below for you to review.    If any questions come up feel free to reach out otherwise I will be in contact when I have ultrasound results.      INTRODUCTION TO THYROID NODULES    - The thyroid is a butterfly-shaped gland in the middle of the neck, located below the larynx (voice box) and above the clavicles (collarbones). Thyroid nodules are round or oval-shaped areas within the thyroid that can be caused by a number of conditions, most of which are not serious.   - Reassuringly, approximately 95% of all thyroid nodules are caused by benign (noncancerous) conditions.      SYMPTOMS OF THYROID NODULES    - Most thyroid nodules don't cause signs or symptoms.    - Occasionally some nodules become so large that they can be felt, seen (often as a swelling at the base of your neck), or if very large can press on your windpipe or esophagus causing shortness of breath or difficulty swallowing.    - In some cases, thyroid nodules produce additional thyroxine, a hormone secreted by your thyroid gland. The extra thyroxine can cause symptoms of an overproduction of thyroid hormones (hyperthyroidism), such as unexplained weight loss, increased sweating, tremor, nervousness, or rapid/irregular heartbeat.    - Only a small number of thyroid nodules are  cancerous. But determining which nodules are cancerous can't be done by evaluating your symptoms alone. Most cancerous thyroid nodules are slow growing and may be small when your doctor discovers them. Aggressive thyroid cancers are rare with nodules that may be large, firm, fixed, and rapid growing.      CAUSES OF THYROID NODULES    - Overgrowth of normal thyroid tissue. An overgrowth of normal thyroid tissue is sometimes referred to as a thyroid adenoma. It's unclear why this occurs, but it's not cancerous and isn't considered serious unless it causes bothersome symptoms from its size.    - Thyroid cyst. Fluid-filled cavities (cysts) in the thyroid most commonly result from degenerating thyroid adenomas. Often, solid components are mixed with fluid in thyroid cysts. Cysts are usually noncancerous, but they occasionally contain cancerous solid components.    - Chronic inflammation of the thyroid. Hashimoto's disease, a thyroid disorder, can cause thyroid inflammation and result in enlarged nodules.    - Multinodular goiter. The term goiter is used to describe any enlargement of the thyroid gland, which can be caused by iodine deficiency or a thyroid disorder. A multinodular goiter contains multiple distinct nodules within the goiter, but its cause is less clear.    - Thyroid cancer. The chances that a nodule is cancerous are small. However, a nodule that is large and hard or causes pain or discomfort is more worrisome.     - Certain factors increase your risk of thyroid cancer, such as a family history of thyroid or other endocrine cancers and having a history of radiation exposure from medical therapy or from nuclear fallout.    - Iodine deficiency. Lack of iodine in your diet can sometimes cause your thyroid gland to develop thyroid nodules. But iodine deficiency is uncommon in the [place], where iodine is routinely added to table salt and other foods.      DIAGNOSIS OF THYROID NODULES    -  Thyroid-stimulating hormone (TSH) can be measured with a blood test.   - If TSH is normal, the next step is to have a thyroid ultrasound. Depending upon the appearance of the nodule on ultrasound, a fine-needle aspiration (FNA) biopsy may be recommended.  - Low levels of TSH in the blood may indicate that a nodule is producing high levels of thyroid hormone. If your TSH level is lower than normal, the next step is to have a thyroid scan.  - High levels of TSH may indicate autoimmune inflammation of the thyroid (called Hashimoto's thyroiditis). Another blood test to measure levels of thyroid antibodies, is sometimes recommended in this case.    - Thyroid ultrasound should be done if you have a suspected thyroid nodule or nodular goiter after a physical examination. Ultrasound should also be done if nodules are found through imaging studies done for other reasons (for example, carotid ultrasound, computed tomography [CT], magnetic resonance imaging [MRI], or positron emission tomography [PET] scan). Thyroid ultrasonography provides information about the size and anatomy of the thyroid gland and nearby structures in the neck as well as the characteristics of the nodule. This information can be used to identify which nodules require FNA biopsy.      - Fine-needle aspiration (FNA) biopsy -- In most cases, the TSH level is normal, and if the ultrasound shows features that are suspicious for cancer, the next step is fine-needle aspiration (FNA). FNA uses a thin needle to remove small tissue samples from the thyroid nodule. The tissue is then examined with a microscope.  - FNA biopsy can be done in a doctor's office with a local anesthetic spray (medicine to numb the area). It is performed using ultrasound guidance. You may feel mild discomfort with the biopsy, but it should not be very painful.    The results of an FNA biopsy will be one of the following:  - Benign (noncancerous)  - Malignant (cancerous)  - Suspicious for  "malignancy  - Indeterminate - This means that the findings are neither clearly benign nor malignant, the risk of malignancy is low, and further testing may be advised. The following classifications are considered indeterminate, and require further evaluation:  Follicular neoplasm (microfollicular nodules, including Hürthle cell lesions)  Follicular lesion or atypia of undetermined significance (nodules with atypical cells)  - Nondiagnostic or insufficient - In this case, the biopsy does not contain enough tissue to make a diagnosis, and a repeat biopsy is necessary.    ["Indeterminate" results make up approximately 15% of cases. Surgical removal of a nodule may be recommended for indeterminate nodules for a definitive diagnosis. However, in most cases, a biopsy sample is tested for specific "molecular markers" (genetic characteristics that affect how likely a nodule is to be malignant) instead of being removed with surgery. This information is used to determine whether the nodule should be observed or removed surgically for closer examination.]    - Thyroid scan -- Most people do not have to have a thyroid scan, but it may be recommended if your TSH level is low. A thyroid scan can help to determine if a thyroid nodule is "hot," meaning it produces too much thyroid hormone, or "cold," meaning it does not. The scan is typically performed after taking a small dose of radioactive iodine (in the form of pill); alternatively, a substance called technetium may be injected into a vein, but this is less reliable. Because the dose of radioactive iodine (or technetium) is small, the amount of radiation exposure you get from a thyroid scan is relatively low. The risk of exposure is small compared with the benefit of knowing if you will need treatment. However, if you are pregnant or breastfeeding, you should not have a thyroid scan.      TREATMENT OF THYROID NODULES    - Benign thyroid nodules -- Benign thyroid nodules usually " "develop as a result of overgrowth of normal thyroid tissue. Surgery is not usually recommended, and a benign nodule can be monitored with ultrasound over time. If it grows, a repeat biopsy or surgery may be recommended. Most surgeons recommend excision of large nodules over 4 cm.    - Malignant thyroid nodules (thyroid cancer) -- Only approximately 5% of all thyroid nodules are malignant. Most people with thyroid cancer have an excellent chance of cure or long-term survival.    - Suspicious for malignancy -- Nodules in this category have a 50 to 75 percent risk of malignancy. People with nodules that are suspicious for malignancy frequently have a lobectomy (in which part of the thyroid is removed) or a total thyroidectomy (removal of the entire thyroid) because the chance that the nodule is a cancer is higher than the chance it is benign.    - Follicular neoplasm -- If biopsy shows follicular neoplasm, your doctor may perform a thyroid scan, especially if TSH level is low or low-normal. About 15-20% of follicular neoplasms prove to be cancer. If the scan shows a "cold" (non-hormone-producing) nodule or your TSH is not low, your provider may test a biopsy sample for certain molecular markers (if available). This information is used to determine whether the nodule should be observed or removed surgically for closer examination.  If surgery is necessary, a hemithyroidectomy (removal of half of the thyroid) or a total thyroidectomy may be recommended depending on the results of the molecular testing, the size of the nodule, and your preferences. "Hot" thyroid nodules are usually not cancerous, and treatment options are based on the results of thyroid function tests and other factors.    - Follicular lesion or atypia of undetermined significance -- Most people whose biopsy shows nodules with atypical cells require repeat fine-needle aspiration (FNA). The optimal treatment depends upon individual factors, such as " "personal risk for thyroid cancer and your past test results (including biopsy, molecular testing, and ultrasound). Molecular markers are frequently used to select low-risk nodules for observation rather than surgery.    - Nondiagnostic -- A nondiagnostic (or insufficient) biopsy does not have enough cells for interpretation. It should not be considered a negative biopsy. If your biopsy came back as nondiagnostic, the FNA should be repeated using ultrasound guidance.    - "Hot" thyroid nodules -- Some thyroid nodules produce thyroid hormone, similar to the thyroid gland, but do not respond to the body's hormonal controls. These nodules are called "hot" or "autonomous" thyroid nodules. They are almost always benign, but they can produce too much thyroid hormone, a condition known as hyperthyroidism. If you have an autonomous thyroid nodule and high levels of thyroid hormone, you will probably be advised to have surgery to remove the thyroid nodule, or to undergo radioactive iodine treatment to destroy the nodule.    - Cystic thyroid nodules -- Cystic thyroid nodules are usually benign nodules that have filled with fluid. These nodules may simply collapse when the fluid is removed. Cystic nodules are usually monitored for changes. If the cyst reforms or if the nodule bleeds more than once, surgery can be performed to remove the thyroid nodule.    "

## 2024-11-23 NOTE — ASSESSMENT & PLAN NOTE
Remains under care of PCP.  Now on metformin with decent glucose control.  Latest A1c slightly elevated at 7.1%.

## 2024-11-23 NOTE — ASSESSMENT & PLAN NOTE
Reviewed recent CT scan and history.  No specific symptoms of compression from the left side thyroid nodule.  No prior imaging to review.  Nodule appeared to be around 2 cm on imaging so further evaluation needed to see if this meets criteria for FNA and if there are other nodules.      Plan:  - Check thyroid ultrasound soon  - If nodule meets FNA criteria we will arrange biopsy at main campus [facility] endocrine office  - If nodule not concerning but meeting follow up criteria when repeat US at 1 year will be arranged.

## 2024-11-25 ENCOUNTER — HOSPITAL ENCOUNTER (OUTPATIENT)
Dept: RADIOLOGY | Facility: HOSPITAL | Age: 69
Discharge: HOME | End: 2024-11-25
Attending: STUDENT IN AN ORGANIZED HEALTH CARE EDUCATION/TRAINING PROGRAM
Payer: MEDICARE

## 2024-11-25 DIAGNOSIS — E04.1 THYROID NODULE: ICD-10-CM

## 2024-11-25 PROCEDURE — 76536 US EXAM OF HEAD AND NECK: CPT | Mod: TC,PO

## 2024-11-25 PROCEDURE — 76536 US EXAM OF HEAD AND NECK: CPT | Mod: 26,,, | Performed by: STUDENT IN AN ORGANIZED HEALTH CARE EDUCATION/TRAINING PROGRAM
